# Patient Record
Sex: FEMALE | Race: BLACK OR AFRICAN AMERICAN | Employment: OTHER | URBAN - METROPOLITAN AREA
[De-identification: names, ages, dates, MRNs, and addresses within clinical notes are randomized per-mention and may not be internally consistent; named-entity substitution may affect disease eponyms.]

---

## 2017-01-06 NOTE — TELEPHONE ENCOUNTER
Tiff Sun LPN at 46/76/8952 44:53 AM      Status: Signed : Tiff Sun LPN (Licensed Practical Nurse)       Future Appointments  Date  Time  Provider  Department  Center    11/7/2016  2:40 PM  MD Tamera Wallace   1/1

## 2017-01-09 RX ORDER — POTASSIUM CHLORIDE 10 MEQ/1
TABLET, EXTENDED RELEASE ORAL
Qty: 30 TABLET | Refills: 0 | Status: SHIPPED | OUTPATIENT
Start: 2017-01-09 | End: 2017-01-13

## 2017-01-13 ENCOUNTER — OFFICE VISIT (OUTPATIENT)
Dept: FAMILY MEDICINE CLINIC | Facility: CLINIC | Age: 62
End: 2017-01-13

## 2017-01-13 VITALS
OXYGEN SATURATION: 98 % | HEART RATE: 82 BPM | BODY MASS INDEX: 23 KG/M2 | DIASTOLIC BLOOD PRESSURE: 70 MMHG | SYSTOLIC BLOOD PRESSURE: 102 MMHG | WEIGHT: 136 LBS

## 2017-01-13 DIAGNOSIS — I10 ESSENTIAL HYPERTENSION WITH GOAL BLOOD PRESSURE LESS THAN 140/90: ICD-10-CM

## 2017-01-13 DIAGNOSIS — Z51.81 MEDICATION MONITORING ENCOUNTER: Primary | ICD-10-CM

## 2017-01-13 DIAGNOSIS — I10 ESSENTIAL HYPERTENSION: ICD-10-CM

## 2017-01-13 DIAGNOSIS — M81.6 LOCALIZED OSTEOPOROSIS OF SPINE: ICD-10-CM

## 2017-01-13 DIAGNOSIS — Z23 NEED FOR PROPHYLACTIC VACCINATION AND INOCULATION AGAINST INFLUENZA: ICD-10-CM

## 2017-01-13 DIAGNOSIS — E87.6 HYPOKALEMIA: ICD-10-CM

## 2017-01-13 DIAGNOSIS — K21.9 GASTROESOPHAGEAL REFLUX DISEASE, ESOPHAGITIS PRESENCE NOT SPECIFIED: ICD-10-CM

## 2017-01-13 PROCEDURE — 90686 IIV4 VACC NO PRSV 0.5 ML IM: CPT | Performed by: FAMILY MEDICINE

## 2017-01-13 PROCEDURE — 99214 OFFICE O/P EST MOD 30 MIN: CPT | Performed by: FAMILY MEDICINE

## 2017-01-13 PROCEDURE — 90471 IMMUNIZATION ADMIN: CPT | Performed by: FAMILY MEDICINE

## 2017-01-13 RX ORDER — PREDNISONE 1 MG/1
TABLET ORAL
COMMUNITY
Start: 2017-01-11 | End: 2017-01-13

## 2017-01-13 RX ORDER — AMLODIPINE BESYLATE 2.5 MG/1
TABLET ORAL
Qty: 90 TABLET | Refills: 1 | Status: SHIPPED | OUTPATIENT
Start: 2017-01-13 | End: 2017-07-14

## 2017-01-13 NOTE — PROGRESS NOTES
Sandra Rico IS A 64year old female [address] [place] Patient presents with:  Medication Follow-Up  Imm/Inj: Flu shot       History of present illness:     Doing well, working part time, home visits.      Not on diuretic, but K runs low normal and she gets cra sites    • Insomnia, unspecified    • Encounter for therapeutic drug monitoring    • Anxiety state, unspecified    • Enthesopathy of wrist and carpus    • High blood pressure    • Palpitations    • Glucose intolerance (pre-diabetes)    • Visual impairment MEQ TOTAL) BY MOUTH ONCE DAILY. Disp: 30 tablet Rfl: 0   Methotrexate Sodium, PF, 25 MG/ML Injection Solution 25mg SC once a week. OK to give ones with preservatives.  Disp: 12 mL Rfl: 1   [DISCONTINUED] AmLODIPine Besylate 2.5 MG Oral Tab TAKE 1 TABLET BY Assessment & Plan:   Hakeem Ellison was seen today for medication follow-up and imm/inj. Diagnoses and all orders for this visit:    Medication monitoring encounter  -     ELECTROLYTE PANEL [03813]; Future  -     Magnesium [E];  Future    Essential hype

## 2017-01-13 NOTE — PATIENT INSTRUCTIONS
Please get electrolytes & magnesium done next time you get labs done with Dr. Chirag Latham. We don't have any potassium levels in the last year. Flu shot today. Consider Reclast or Boniva IV for osteoporosis, the drop in spine bone density is concerning.  Dis

## 2017-01-20 RX ORDER — AMLODIPINE BESYLATE 2.5 MG/1
TABLET ORAL
Qty: 90 TABLET | Refills: 1 | OUTPATIENT
Start: 2017-01-20

## 2017-01-20 NOTE — TELEPHONE ENCOUNTER
Future Appointments  Date Time Provider Benjamin Wilkerson   7/14/2017 11:00 AM Aliya Shaw MD [facility] 21 EM[facility] [address]     AmLODIPine Besylate 2.5 MG Oral Tab 90 tablet 1 1/13/2017     Denied already done.

## 2017-02-04 ENCOUNTER — LAB ENCOUNTER (OUTPATIENT)
Dept: LAB | Age: 62
End: 2017-02-04
Attending: FAMILY MEDICINE
Payer: COMMERCIAL

## 2017-02-04 DIAGNOSIS — M06.9 RHEUMATOID ARTHRITIS, INVOLVING UNSPECIFIED SITE, UNSPECIFIED RHEUMATOID FACTOR PRESENCE: ICD-10-CM

## 2017-02-04 DIAGNOSIS — Z51.81 MEDICATION MONITORING ENCOUNTER: ICD-10-CM

## 2017-02-04 DIAGNOSIS — E87.6 HYPOKALEMIA: ICD-10-CM

## 2017-02-04 DIAGNOSIS — I10 ESSENTIAL HYPERTENSION: ICD-10-CM

## 2017-02-04 DIAGNOSIS — M81.0 SENILE OSTEOPOROSIS: ICD-10-CM

## 2017-02-04 DIAGNOSIS — Z79.899 ENCOUNTER FOR LONG-TERM (CURRENT) USE OF HIGH-RISK MEDICATION: ICD-10-CM

## 2017-02-04 DIAGNOSIS — M05.79 RHEUMATOID ARTHRITIS INVOLVING MULTIPLE SITES WITH POSITIVE RHEUMATOID FACTOR (HCC): ICD-10-CM

## 2017-02-04 LAB
ALBUMIN SERPL-MCNC: 3.6 G/DL (ref 3.5–4.8)
ALP LIVER SERPL-CCNC: 62 U/L (ref 50–130)
ALT SERPL-CCNC: 25 U/L (ref 14–54)
AST SERPL-CCNC: 17 U/L (ref 15–41)
BASOPHILS # BLD AUTO: 0.06 X10(3) UL (ref 0–0.1)
BASOPHILS NFR BLD AUTO: 1.1 %
BILIRUB DIRECT SERPL-MCNC: 0.1 MG/DL (ref 0.1–0.5)
BILIRUB SERPL-MCNC: 0.5 MG/DL (ref 0.1–2)
BUN BLD-MCNC: 13 MG/DL (ref 8–20)
C-REACTIVE PROTEIN: <0.29 MG/DL (ref ?–1)
CHLORIDE: 107 MMOL/L (ref 101–111)
CO2: 30 MMOL/L (ref 22–32)
CREAT BLD-MCNC: 0.86 MG/DL (ref 0.55–1.02)
EOSINOPHIL # BLD AUTO: 0.04 X10(3) UL (ref 0–0.3)
EOSINOPHIL NFR BLD AUTO: 0.7 %
ERYTHROCYTE [DISTWIDTH] IN BLOOD BY AUTOMATED COUNT: 14.9 % (ref 11.5–16)
HAV IGM SER QL: 2.1 MG/DL (ref 1.7–3)
HCT VFR BLD AUTO: 36.9 % (ref 34–50)
HGB BLD-MCNC: 11.1 G/DL (ref 12–16)
IMMATURE GRANULOCYTE COUNT: 0.01 X10(3) UL (ref 0–1)
IMMATURE GRANULOCYTE RATIO %: 0.2 %
LYMPHOCYTES # BLD AUTO: 2.73 X10(3) UL (ref 0.9–4)
LYMPHOCYTES NFR BLD AUTO: 49.9 %
M PROTEIN MFR SERPL ELPH: 6.9 G/DL (ref 6.1–8.3)
MCH RBC QN AUTO: 22.9 PG (ref 27–33.2)
MCHC RBC AUTO-ENTMCNC: 30.1 G/DL (ref 31–37)
MCV RBC AUTO: 76.2 FL (ref 81–100)
MONOCYTES # BLD AUTO: 0.31 X10(3) UL (ref 0.1–0.6)
MONOCYTES NFR BLD AUTO: 5.7 %
NEUTROPHIL ABS PRELIM: 2.32 X10 (3) UL (ref 1.3–6.7)
NEUTROPHILS # BLD AUTO: 2.32 X10(3) UL (ref 1.3–6.7)
NEUTROPHILS NFR BLD AUTO: 42.4 %
PLATELET # BLD AUTO: 283 10(3)UL (ref 150–450)
POTASSIUM SERPL-SCNC: 4.1 MMOL/L (ref 3.6–5.1)
RBC # BLD AUTO: 4.84 X10(6)UL (ref 3.8–5.1)
RED CELL DISTRIBUTION WIDTH-SD: 40 FL (ref 35.1–46.3)
SED RATE-ML: 12 MM/HR (ref 0–25)
SODIUM SERPL-SCNC: 142 MMOL/L (ref 136–144)
WBC # BLD AUTO: 5.5 X10(3) UL (ref 4–13)

## 2017-02-04 PROCEDURE — 83735 ASSAY OF MAGNESIUM: CPT

## 2017-02-04 PROCEDURE — 80076 HEPATIC FUNCTION PANEL: CPT

## 2017-02-04 PROCEDURE — 86140 C-REACTIVE PROTEIN: CPT

## 2017-02-04 PROCEDURE — 85652 RBC SED RATE AUTOMATED: CPT

## 2017-02-04 PROCEDURE — 82565 ASSAY OF CREATININE: CPT

## 2017-02-04 PROCEDURE — 84520 ASSAY OF UREA NITROGEN: CPT

## 2017-02-04 PROCEDURE — 80051 ELECTROLYTE PANEL: CPT

## 2017-02-04 PROCEDURE — 36415 COLL VENOUS BLD VENIPUNCTURE: CPT

## 2017-02-04 PROCEDURE — 85025 COMPLETE CBC W/AUTO DIFF WBC: CPT

## 2017-02-06 NOTE — PROGRESS NOTES
Quick Note:    Will discuss results with pt at follow up visit.   2/6/2017 11:30 AM [facility]   7/14/2017 11:00 AM Omer Grubbs MD EMG 21 EMG [address]      ______

## 2017-02-10 RX ORDER — LANSOPRAZOLE 30 MG/1
CAPSULE, DELAYED RELEASE ORAL
Qty: 45 CAPSULE | Refills: 0 | Status: SHIPPED | OUTPATIENT
Start: 2017-02-10 | End: 2017-05-08

## 2017-02-10 NOTE — TELEPHONE ENCOUNTER
Future Appointments  Date Time Provider Benjamin Wilkerson   5/15/2017 2:20 PM MD Husam Boo   7/14/2017 11:00 AM Omer Grubbs MD EMG 21 EMG [address]     LOV 1/17    LAST LAB    LAST RX  LANSOPRAZOLE 30 MG Oral Capsule Delayed Releas

## 2017-03-14 ENCOUNTER — APPOINTMENT (OUTPATIENT)
Dept: CV DIAGNOSTICS | Facility: HOSPITAL | Age: 62
End: 2017-03-14
Attending: EMERGENCY MEDICINE
Payer: COMMERCIAL

## 2017-03-14 ENCOUNTER — APPOINTMENT (OUTPATIENT)
Dept: GENERAL RADIOLOGY | Facility: HOSPITAL | Age: 62
End: 2017-03-14
Attending: EMERGENCY MEDICINE
Payer: COMMERCIAL

## 2017-03-14 PROBLEM — R73.9 HYPERGLYCEMIA: Status: ACTIVE | Noted: 2017-03-14

## 2017-03-14 PROBLEM — E87.6 HYPOKALEMIA: Status: ACTIVE | Noted: 2017-03-14

## 2017-03-14 PROBLEM — D64.9 ANEMIA: Status: ACTIVE | Noted: 2017-03-14

## 2017-03-14 PROCEDURE — 93017 CV STRESS TEST TRACING ONLY: CPT

## 2017-03-14 PROCEDURE — 71010 XR CHEST AP PORTABLE  (CPT=71010): CPT

## 2017-03-14 PROCEDURE — 93018 CV STRESS TEST I&R ONLY: CPT | Performed by: INTERNAL MEDICINE

## 2017-03-14 PROCEDURE — 93350 STRESS TTE ONLY: CPT | Performed by: INTERNAL MEDICINE

## 2017-03-14 PROCEDURE — 93350 STRESS TTE ONLY: CPT

## 2017-03-14 NOTE — ED PROVIDER NOTES
Patient Seen in: [facility] Emergency Department    History   Patient presents with:  Chest Pain Angina (cardiovascular)    Stated Complaint: chest pain    HPI    Patient is a 59-year-old female comes in emergency room for evaluation of chest pain. Alpha thalassemia trait    • Vitamin D deficiency    • Breast calcifications      Inferomedial L breast   • GERD (gastroesophageal reflux disease)    • Esophagitis, reflux    • Diarrhea      w/ Protonix   • Diverticulosis of colon (without mention of hemor ONE CAPSULE BY MOUTH 3 TIMES A WEEK   BD INSULIN SYR ULTRAFINE II 31G X 5/16\" 1 ML Does not apply Misc,     AmLODIPine Besylate 2.5 MG Oral Tab,  TAKE 1 TABLET BY MOUTH EVERY EVENING.    Insulin Syringe-Needle U-100 31G X 3/8\" 1 ML Does not apply Misc,  T 100%        Physical Exam    GENERAL: No acute distress, well appearing and non-toxic, Alert and oriented X 3   HEENT: Normocephalic, atraumatic. Moist mucous membranes.   Pupils equal round reactive to light accommodation, extraocular motion is intact, sc DRAW LIGHT GREEN      EKG    Rate, intervals and axes as noted on EKG Report. Rate: 76  Rhythm: Sinus Rhythm  Reading: No acute change          Chest x-ray negative    ER course: Patient placed in observation status.   She was given GI cocktail with improv

## 2017-03-14 NOTE — BH PROGRESS NOTE
Pt reports 1st preference for in-network hospitals is [facility]. Pt notified of need to transfer out. [facility] are on chart and copies were faxed to [facility].

## 2017-03-14 NOTE — ED NOTES
Patient quiet with family at bedside and attentive. Patient still does not elaborate on the \"intrusive thoughts\" that cause her to think about harming others.  Does not say who the \"others\" are and does not identify a recent stressor that has made these

## 2017-03-14 NOTE — ED NOTES
EKG: Dated March 14, 2017, 6:02 AM.    Normal sinus rhythm. Ventricular rate of 74. Minimal voltage criteria for LVH. Rate, axis, intervals are noted. No acute ST elevations or depressions are noted.   Agree with computer interpretation    Patient endor

## 2017-03-14 NOTE — PROGRESS NOTES
CARDIODIAGNOSTIC PRELIMINARY REPORT:    JANELLE protocol completed for 7:31 with no new EKG changes noted; no arrhythmias    Denied cardiac symptoms    Base:  122/84, HR 75;  Peak: 152/86,  (102% APMHR)  Second set of images pending    Pt, returned to

## 2017-03-14 NOTE — ED NOTES
Patient was seen and evaluated by Dr. Dionte Medina. Her follow-up workup was negative for cardiac ischemia. Her stress test was negative.   Patient was medically cleared for evaluation by [facility].    Patient was seen and evaluated by shivani pimentel
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
## 2017-03-14 NOTE — ED NOTES
Repeat troponin is negative. Consultation obtained with [facility] Dr. Muniz Crew.   Patient to undergo a stress echocardiogram.

## 2017-03-14 NOTE — BH PROGRESS NOTE
Patient was accepted to [facility] under Dr. Nanci Rizvi  RN to RN # [phone] with Moi paz from [facility]  spoke with [place] on 3/14/17 @ 6:13 pm  auth # [id number]  Auth  4 inp  days 3/14-3/17  Gave a

## 2017-03-14 NOTE — ED NOTES
Bedside report obtained from [address] Pt calm, awake and alert. Awaiting to go to [facility].

## 2017-03-14 NOTE — ED INITIAL ASSESSMENT (HPI)
Pt arrived via ems, called for chest pain that woke her from sleep. +dizziness at that time. Denies shortness of breath. Asa 324mg and nitro spray x 1 given with some relief.  5/10 pain on arrival.

## 2017-03-15 NOTE — ED NOTES
Accepting Physciain at [address] [place] [address],[place] [id number]. Spoke with Alisson Vazquez given Nurse to Nurse.

## 2017-03-15 NOTE — ED NOTES
OK per ERMD to ambulate patient who would like to \"stretch her legs\"   Ambulates easily, gait steady.

## 2017-03-15 NOTE — ED NOTES
Call to [facility] ambulance for transport ETA 9PM. Asked dispatcher to find another ambulance for transport.

## 2017-04-14 ENCOUNTER — LAB ENCOUNTER (OUTPATIENT)
Dept: LAB | Age: 62
End: 2017-04-14
Attending: FAMILY MEDICINE
Payer: COMMERCIAL

## 2017-04-14 ENCOUNTER — HOSPITAL ENCOUNTER (EMERGENCY)
Facility: HOSPITAL | Age: 62
Discharge: HOME | End: 2017-04-14
Attending: EMERGENCY MEDICINE
Payer: COMMERCIAL

## 2017-04-14 ENCOUNTER — APPOINTMENT (OUTPATIENT)
Dept: MRI IMAGING | Facility: HOSPITAL | Age: 62
End: 2017-04-14
Attending: EMERGENCY MEDICINE
Payer: COMMERCIAL

## 2017-04-14 VITALS
RESPIRATION RATE: 18 BRPM | DIASTOLIC BLOOD PRESSURE: 93 MMHG | OXYGEN SATURATION: 100 % | SYSTOLIC BLOOD PRESSURE: 157 MMHG | HEIGHT: 63 IN | WEIGHT: 115 LBS | HEART RATE: 74 BPM | BODY MASS INDEX: 20.37 KG/M2 | TEMPERATURE: 98 F

## 2017-04-14 DIAGNOSIS — Z79.899 ENCOUNTER FOR LONG-TERM (CURRENT) USE OF MEDICATIONS: ICD-10-CM

## 2017-04-14 DIAGNOSIS — M81.0 OSTEOPOROSIS: ICD-10-CM

## 2017-04-14 DIAGNOSIS — M05.79 RHEUMATOID ARTHRITIS INVOLVING MULTIPLE SITES WITH POSITIVE RHEUMATOID FACTOR (HCC): ICD-10-CM

## 2017-04-14 DIAGNOSIS — R79.89 LOW TSH LEVEL: ICD-10-CM

## 2017-04-14 DIAGNOSIS — R20.2 PARESTHESIAS: Primary | ICD-10-CM

## 2017-04-14 DIAGNOSIS — Z79.899 ENCOUNTER FOR LONG-TERM (CURRENT) USE OF HIGH-RISK MEDICATION: ICD-10-CM

## 2017-04-14 DIAGNOSIS — M62.81 MUSCLE WEAKNESS: ICD-10-CM

## 2017-04-14 PROCEDURE — 80053 COMPREHEN METABOLIC PANEL: CPT

## 2017-04-14 PROCEDURE — 70551 MRI BRAIN STEM W/O DYE: CPT

## 2017-04-14 PROCEDURE — 93005 ELECTROCARDIOGRAM TRACING: CPT

## 2017-04-14 PROCEDURE — 72141 MRI NECK SPINE W/O DYE: CPT

## 2017-04-14 PROCEDURE — 86140 C-REACTIVE PROTEIN: CPT

## 2017-04-14 PROCEDURE — 85025 COMPLETE CBC W/AUTO DIFF WBC: CPT

## 2017-04-14 PROCEDURE — 84443 ASSAY THYROID STIM HORMONE: CPT

## 2017-04-14 PROCEDURE — 99285 EMERGENCY DEPT VISIT HI MDM: CPT

## 2017-04-14 PROCEDURE — 82306 VITAMIN D 25 HYDROXY: CPT

## 2017-04-14 PROCEDURE — 80061 LIPID PANEL: CPT

## 2017-04-14 PROCEDURE — 36415 COLL VENOUS BLD VENIPUNCTURE: CPT

## 2017-04-14 PROCEDURE — 93010 ELECTROCARDIOGRAM REPORT: CPT

## 2017-04-14 PROCEDURE — 85652 RBC SED RATE AUTOMATED: CPT

## 2017-04-14 PROCEDURE — 83036 HEMOGLOBIN GLYCOSYLATED A1C: CPT

## 2017-04-14 PROCEDURE — 83735 ASSAY OF MAGNESIUM: CPT

## 2017-04-14 NOTE — ED NOTES
[facility] notified of pt request for evaluation and this RN attempting to make pt \"fast pass\" for evaluation. [facility] reports pt is out of network, staff will fax over referrals for pt to follow up with on outpatient basis.

## 2017-04-14 NOTE — ED NOTES
Pt given discharge instructions verbally. Pt requesting copy of MRI report. Informed pt that MRI report is included in paper work. Pt attempted to remove dc papers from my hand.   This RN completed discharge instructions prior to handing over pt's discha

## 2017-04-15 NOTE — ED NOTES
Pt and spouse frustrated with wait time for fax. Apologized profusely to pt and spouse regarding delay in discharge.

## 2017-04-15 NOTE — ED NOTES
Spoke with NGOZI. [facility]  \"tied up\" and will fax paper work as soon as possible.

## 2017-04-16 ENCOUNTER — TELEPHONE (OUTPATIENT)
Dept: FAMILY MEDICINE CLINIC | Facility: CLINIC | Age: 62
End: 2017-04-16

## 2017-04-17 ENCOUNTER — TELEPHONE (OUTPATIENT)
Dept: NEUROLOGY | Facility: CLINIC | Age: 62
End: 2017-04-17

## 2017-04-17 NOTE — TELEPHONE ENCOUNTER
Was called by [facility] on 4/14/17 to inform that pt was seen in ED for neurologic sxs, workup without acute findings but will need f/u. Please see ER records for details.

## 2017-04-25 PROCEDURE — 87086 URINE CULTURE/COLONY COUNT: CPT | Performed by: UROLOGY

## 2017-04-25 PROCEDURE — 81001 URINALYSIS AUTO W/SCOPE: CPT | Performed by: UROLOGY

## 2017-05-09 NOTE — ED NOTES
Luca Taylor File at [facility] pt is accepted at [facility]. [phone] nurses station to contact for report.
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
## 2017-05-09 NOTE — ED NOTES
Patients belongings are in two secure bags #[id number] and [id number] located in the b pod md charting area

## 2017-05-09 NOTE — BH LEVEL OF CARE ASSESSMENT
Level of Care Assessment Note  General Questions   Why are you here?: \"I'm here because my  and I got into an argument. \" Pt states argument was about \"what the schedule should be. \" When asked why she came to the ER, pt states \"He made a stateme months later she quit. She had to retire because of her depressed state. Collateral Information Obtained: In person, Collateral   Collateral Information: ER RN and MD: Pt's  states pt has been acting abnormally and fears for her safety.  Pt isn't e hasn't ever spoken like this. Her face didn't look like her. My wife feels over the past year that everything is great. She sees me as a deadbeat if I sit down on the couch after making meals and mowing the lawn.  I spent hours trying to get her to go to th Suicidal Ideation: Yes   Describe Past Suicidal Ideation: Pt reports \"not concrete\" suicidal thoughts a few months ago   Past Suicide Plan: No   Past Suicide Intent: No   Past Suicide Rehearsal: No   Past Suicide Attempt: No   Past Suicide Risk Mitigatin upset\"   Current/Recent Destructive Behavior Toward Property Ideation: No   Current/Recent Destructive Behavior Toward Property Plan: No   Current/Recent Destrucive Behavior Toward Property Intent: No   Current/Recent Destructive Behavior Toward Property helplessness and hopelessness   Anxiety Symptoms: Generalized   Panic Attacks: pt states \"I have some anxiety\"; pt states if she gets panic attacks \"they are usually pretty mild\"   Trauma Reaction: Other (pt denied)   Bipolar Symptoms: Irritability; Dec Seclusion/Restraint: No   Addictions Screen   Do you sometimes drink beer, wine or other alcohol beverages?: No   Tobacco Use: Never smoked/never used tobacco product   Caffeine (include beverages/tablets) Use: No   Used substances (other than as prescribe Contact: Good   Level of Consciousness: Alert   Exhibited Behavior : Guarded; Appropriate to situation   Gait/Movement: Other (Comment) (rajan; pt was laying on hospital bed)   Speech Characteristics: Normal volume;Delayed   Concentration: Unimpaired   Memory physically aggressive again. Pt denies SI/HI. Level of Care Recommendations   Consulted with: Dr. Renard Bhagat recommended inpt admission at this time.    Level of Care Recommendation: Inpatient Acute Care   Unit: [facility]   Inpatient Criteria: Failu

## 2017-05-09 NOTE — PROGRESS NOTES
[facility] call back stating they spoke with their [facility] and are able to accept the pt. Working on obtaining precert from [place].

## 2017-05-09 NOTE — ED NOTES
Assumed care, pt been evaluated by [facility] staff, Leopoldo Michele

## 2017-05-09 NOTE — ED NOTES
Aliya Lozano from [facility] at bedside for eval at this time.

## 2017-05-09 NOTE — TELEPHONE ENCOUNTER
Future Appointments  Date Time Provider Benjamin Rhea   5/15/2017 2:20 PM MD Ronen Tom   5/17/2017 10:15 AM Bess Pimentel MD [facility]   6/21/2017 11:00 AM Antonieta Means, [address] [facility]   7/14/2017 11:00 AM Pe

## 2017-05-15 RX ORDER — LANSOPRAZOLE 30 MG/1
CAPSULE, DELAYED RELEASE ORAL
Qty: 45 CAPSULE | Refills: 0 | Status: SHIPPED | OUTPATIENT
Start: 2017-05-15 | End: 2017-08-28 | Stop reason: ALTCHOICE

## 2017-05-22 ENCOUNTER — MED REC SCAN ONLY (OUTPATIENT)
Dept: FAMILY MEDICINE CLINIC | Facility: CLINIC | Age: 62
End: 2017-05-22

## 2017-05-30 ENCOUNTER — TELEPHONE (OUTPATIENT)
Dept: FAMILY MEDICINE CLINIC | Facility: CLINIC | Age: 62
End: 2017-05-30

## 2017-05-31 NOTE — TELEPHONE ENCOUNTER
[facility]OSP CALLED, PT BEING DISCHARGE  MADE A NEW HOSP F/U APPT.     Future Appointments  Date Time Provider Benjamin Wilkerson   6/8/2017 11:00 AM Lee Salazar MD EMG 21 EMG [address]   6/13/2017 5:30 PM Matt Dumont MD [facility]

## 2017-06-08 ENCOUNTER — OFFICE VISIT (OUTPATIENT)
Dept: FAMILY MEDICINE CLINIC | Facility: CLINIC | Age: 62
End: 2017-06-08

## 2017-06-08 VITALS
SYSTOLIC BLOOD PRESSURE: 126 MMHG | OXYGEN SATURATION: 98 % | HEIGHT: 63.5 IN | WEIGHT: 110.38 LBS | DIASTOLIC BLOOD PRESSURE: 80 MMHG | HEART RATE: 91 BPM | TEMPERATURE: 99 F | RESPIRATION RATE: 16 BRPM | BODY MASS INDEX: 19.32 KG/M2

## 2017-06-08 DIAGNOSIS — N39.0 URINARY TRACT INFECTION WITHOUT HEMATURIA, SITE UNSPECIFIED: Primary | ICD-10-CM

## 2017-06-08 DIAGNOSIS — K59.00 CONSTIPATION, UNSPECIFIED CONSTIPATION TYPE: ICD-10-CM

## 2017-06-08 DIAGNOSIS — M06.00 SERONEGATIVE RHEUMATOID ARTHRITIS (HCC): ICD-10-CM

## 2017-06-08 DIAGNOSIS — R63.4 WEIGHT LOSS: ICD-10-CM

## 2017-06-08 PROCEDURE — 99214 OFFICE O/P EST MOD 30 MIN: CPT | Performed by: FAMILY MEDICINE

## 2017-06-08 PROCEDURE — 81003 URINALYSIS AUTO W/O SCOPE: CPT | Performed by: FAMILY MEDICINE

## 2017-06-08 PROCEDURE — 87086 URINE CULTURE/COLONY COUNT: CPT | Performed by: FAMILY MEDICINE

## 2017-06-11 NOTE — PATIENT INSTRUCTIONS
Patient was requested to have psychiatrist call me after her upcoming visit so we can coordinate and discuss medical aspects of her care especially concerning her weight loss.  She will consider granting us permission to talk about sensitive issues with her

## 2017-06-23 ENCOUNTER — TELEPHONE (OUTPATIENT)
Dept: FAMILY MEDICINE CLINIC | Facility: CLINIC | Age: 62
End: 2017-06-23

## 2017-06-23 NOTE — TELEPHONE ENCOUNTER
Received/reviewed neuro eval, pt appears to be inpatient again with DX MDD with psychotic features. Neurologist Dr Jennifer Lay does not feel pt has cognitive impairment. Noted normal CT last month. Neuropsych testing outpatient could be considered.  Meds i

## 2017-07-14 ENCOUNTER — OFFICE VISIT (OUTPATIENT)
Dept: FAMILY MEDICINE CLINIC | Facility: CLINIC | Age: 62
End: 2017-07-14

## 2017-07-14 VITALS
BODY MASS INDEX: 18.55 KG/M2 | HEIGHT: 63.5 IN | HEART RATE: 82 BPM | DIASTOLIC BLOOD PRESSURE: 86 MMHG | SYSTOLIC BLOOD PRESSURE: 124 MMHG | TEMPERATURE: 98 F | RESPIRATION RATE: 16 BRPM | WEIGHT: 106 LBS

## 2017-07-14 DIAGNOSIS — R63.4 WEIGHT LOSS: Primary | ICD-10-CM

## 2017-07-14 DIAGNOSIS — K59.00 CONSTIPATION, UNSPECIFIED CONSTIPATION TYPE: ICD-10-CM

## 2017-07-14 DIAGNOSIS — F33.3 SEVERE EPISODE OF RECURRENT MAJOR DEPRESSIVE DISORDER, WITH PSYCHOTIC FEATURES (HCC): ICD-10-CM

## 2017-07-14 DIAGNOSIS — I10 ESSENTIAL HYPERTENSION WITH GOAL BLOOD PRESSURE LESS THAN 140/90: ICD-10-CM

## 2017-07-14 PROCEDURE — 99214 OFFICE O/P EST MOD 30 MIN: CPT | Performed by: FAMILY MEDICINE

## 2017-07-14 RX ORDER — AMLODIPINE BESYLATE 2.5 MG/1
TABLET ORAL
Qty: 90 TABLET | Refills: 1 | Status: SHIPPED | OUTPATIENT
Start: 2017-07-14 | End: 2018-01-03

## 2017-07-14 RX ORDER — POTASSIUM CHLORIDE 750 MG/1
TABLET, EXTENDED RELEASE ORAL
Qty: 90 TABLET | Refills: 1 | Status: SHIPPED | OUTPATIENT
Start: 2017-07-14 | End: 2017-10-10

## 2017-07-14 NOTE — PROGRESS NOTES
Tamika Barragan IS A 64year old female [address] [place] Patient presents with:  Medication Follow-Up       History of present illness:     Discharged 2 weeks ago from [facility]. Was in x 11 days. States she is taking quetiapine. Sleep is fair.  [facility] therapeutic drug monitoring    • Enthesopathy of hip region    • Enthesopathy of wrist and carpus    • Esophageal reflux    • Esophagitis, reflux    • Essential hypertension, benign    • GERD (gastroesophageal reflux disease)    • Glucose intolerance (pre- Delayed Release TAKE ONE CAPSULE BY MOUTH 3 TIMES A WEEK Disp: 45 capsule Rfl: 0   FOLIC ACID 1 MG Oral Tab TAKE 1 TABLET (1 MG TOTAL) BY MOUTH DAILY.  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 Stress with elder parents, daughter, 's health.        Review of systems:      Occasional chest Oral Tab CR; TAKE 1 TABLET (10 MEQ TOTAL) BY MOUTH ONCE DAILY. -     MAGNESIUM; Future  -     COMP METABOLIC PANEL (14);  Future    Constipation, unspecified constipation type    Severe episode of recurrent major depressive disorder, with psychotic feature

## 2017-07-14 NOTE — PATIENT INSTRUCTIONS
For constipation: Miralax 1 capful daily with fluid. Senokot 1 tablet every 2-3 days but not more often. Try to get at least 6 8-ounce glasses of fluid (water is best) daily.    Try more cooked vegetables, continue fruit, adequate protein and increase fib

## 2017-07-24 NOTE — BH PROGRESS NOTE
Pt will need inpt psych treatment. Pt will need to be tx out. Petition on chart. Pt and family updated.

## 2017-07-24 NOTE — ED NOTES
Spoke with Yohannes from [facility]. She will call for update from [facility].
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
## 2017-07-24 NOTE — BH PROGRESS NOTE
Called [facility] re: transfer request. Spoke with Hamlet Santos who reports  will call back. Awaiting call back.

## 2017-07-24 NOTE — BH PROGRESS NOTE
Spoke with Pasquale Pace at [facility] (alternate # [phone]) re: transfer request. Faxed pt clinical to [phone]. Awaiting call back.

## 2017-07-24 NOTE — ED PROVIDER NOTES
Nursing staff advised me at 6 PM that the patient was complaining of abdominal pain which she stated began after she arrived in the emergency department. She had just completed eating a full dinner. She denies any nausea.   She states that the pain is caio

## 2017-07-24 NOTE — ED NOTES
Pt wants to leave. Pt denies suicidal ideation at this time. Pt  in waiting room. Pt is calm and cooperative at this time.

## 2017-07-24 NOTE — ED NOTES
Peggy Berry of [facility] at [facility] ([phone]) made aware of patient's preference to go to as close a facility as possible for transfer.
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
## 2017-07-24 NOTE — ED NOTES
Pt belongings placed in smartsafe bag # [id number] in C0 locker.  Belongings include clothes, shoes and one earring

## 2017-07-25 NOTE — BH PROGRESS NOTE
Called back Aundrea Merlos with [place]. She directed writer to call the ER crisis worker, Farnaz Pack, ([phone]), at [place] to obtain NPI and additional information required to complete precert.   Spoke to Farnaz Pack and was provided with Dr Tamela Capps as the accepting MD a

## 2017-07-25 NOTE — BH PROGRESS NOTE
Called [facility] Intake to request an update. Spoke to Davey bejarano who stated she would call me back shortly.

## 2017-07-25 NOTE — BH PROGRESS NOTE
Called back [facility] as directed by Cathy Rose crisis worker with [address] requested a call back because she is driving.

## 2017-07-25 NOTE — ED NOTES
All of locked patient belongings returned to patient at this time. Valuables to go home with her  at this time.

## 2017-07-25 NOTE — BH PROGRESS NOTE
Spoke to Sonya Ferrera, [facility] crisis worker. Updated her on conversation with Henri Reese at phone number to [facility] that Sonya Frerera provided. Yareli Arias is cleared for transfer to [facility].

## 2017-07-25 NOTE — ED NOTES
Report given at this time to [address] [place] [address] at [place] [phone], admitted to bed 3203-b under Dr Javad Shukla for severe episode of recurrent major depressive disorder, with psychotic features, suicidal and homicidal ideation.

## 2017-07-25 NOTE — BH PROGRESS NOTE
Bal Brown RN in [facility]. She informed writer that Fadi Parker has been accepted for admission to [facility]. Called back Mallorie Machado with [facility] Intake to obtain additional information to complete pre-cert.   Mallorie Machado informed writer that she had left but will look to lo

## 2017-07-25 NOTE — ED NOTES
Maria Isabel Mesa of [facility] ([phone]) calls at this time to clarify, whether patient has a history of dementia or not. Pt and  state that she does not have a formal dementia diagnosis, but does have \"memory issues. \" She is currently awake and alert X4 at [facility]

## 2017-07-25 NOTE — BH PROGRESS NOTE
Errol Davis, Crisis worker from [facility] called back writer. She provided another [facility]  phone number to try: [phone]. Called and spoke to New benjy with [employer].   New benjy advised writer that precert can not be completed after hours and must be done M-F between th

## 2017-08-28 ENCOUNTER — OFFICE VISIT (OUTPATIENT)
Dept: FAMILY MEDICINE CLINIC | Facility: CLINIC | Age: 62
End: 2017-08-28

## 2017-08-28 VITALS
WEIGHT: 117.63 LBS | HEART RATE: 75 BPM | DIASTOLIC BLOOD PRESSURE: 74 MMHG | RESPIRATION RATE: 16 BRPM | TEMPERATURE: 99 F | HEIGHT: 63.5 IN | SYSTOLIC BLOOD PRESSURE: 110 MMHG | BODY MASS INDEX: 20.59 KG/M2 | OXYGEN SATURATION: 99 %

## 2017-08-28 DIAGNOSIS — I10 ESSENTIAL HYPERTENSION: ICD-10-CM

## 2017-08-28 DIAGNOSIS — R63.4 WEIGHT LOSS: ICD-10-CM

## 2017-08-28 DIAGNOSIS — Z12.31 ENCOUNTER FOR SCREENING MAMMOGRAM FOR BREAST CANCER: Primary | ICD-10-CM

## 2017-08-28 DIAGNOSIS — F39 MOOD DISORDER (HCC): ICD-10-CM

## 2017-08-28 PROCEDURE — 99214 OFFICE O/P EST MOD 30 MIN: CPT | Performed by: FAMILY MEDICINE

## 2017-08-28 RX ORDER — ARIPIPRAZOLE 10 MG/1
5 TABLET ORAL DAILY
COMMUNITY
Start: 2017-08-22 | End: 2017-10-09 | Stop reason: DRUGHIGH

## 2017-08-28 RX ORDER — TAMSULOSIN HYDROCHLORIDE 0.4 MG/1
0.4 CAPSULE ORAL DAILY
COMMUNITY
End: 2017-10-09

## 2017-08-28 NOTE — PATIENT INSTRUCTIONS
Stay off methotrexate & folic acid, until you are able to consult with  [facility]. Stay off tamsulosin for now. If you have problems emptying your bladder, you can consult Dr. Avila Kerns again to see if there is a need to restart it. Continue amlodipine.

## 2017-08-28 NOTE — PROGRESS NOTES
Spencer Silverio IS A 58year old female [address] [place] Patient presents with: Follow - Up  Lab Results       History of present illness:     Was admitted [facility] psych unit 7/24, discharged 8/22/17.  here with patient.      Treated for mood disorder and dep hip region    • Enthesopathy of wrist and carpus    • Esophageal reflux    • Esophagitis, reflux    • Essential hypertension, benign    • GERD (gastroesophageal reflux disease)    • Glucose intolerance (pre-diabetes)    • High blood pressure    • Impaired 3350 Oral Powd Pack Take 17 g by mouth as needed. Disp:  Rfl:    Insulin Syringe-Needle U-100 31G X 3/8\" 1 ML Does not apply Misc To be used for methotrexate injections Disp: 100 each Rfl: 0   Magnesium 400 MG Oral Cap Take 400 mg by mouth daily.  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 Stress with elder parents, daughter, 's health. Review of systems:     Appetite good. No current urinary symptoms. GI symptoms have improved, no constipation or diarrhea, no GERD.      Exam:     BP murmur    Test results: none recent    Requests mammogram order    Discussed with patient and  the need to not make any changes in medication without first consulting with her psychiatrist. Importance of family involvement in therapy, and involving

## 2017-10-09 ENCOUNTER — OFFICE VISIT (OUTPATIENT)
Dept: FAMILY MEDICINE CLINIC | Facility: CLINIC | Age: 62
End: 2017-10-09

## 2017-10-09 VITALS
SYSTOLIC BLOOD PRESSURE: 114 MMHG | BODY MASS INDEX: 22 KG/M2 | TEMPERATURE: 98 F | WEIGHT: 124.19 LBS | HEART RATE: 78 BPM | DIASTOLIC BLOOD PRESSURE: 70 MMHG | HEIGHT: 63 IN | OXYGEN SATURATION: 98 % | RESPIRATION RATE: 16 BRPM

## 2017-10-09 DIAGNOSIS — M06.9 RHEUMATOID ARTHRITIS, INVOLVING UNSPECIFIED SITE, UNSPECIFIED RHEUMATOID FACTOR PRESENCE: ICD-10-CM

## 2017-10-09 DIAGNOSIS — E87.6 HYPOKALEMIA: ICD-10-CM

## 2017-10-09 DIAGNOSIS — E04.9 GOITER: ICD-10-CM

## 2017-10-09 DIAGNOSIS — I10 ESSENTIAL HYPERTENSION: ICD-10-CM

## 2017-10-09 DIAGNOSIS — R63.4 WEIGHT LOSS, UNINTENTIONAL: Primary | ICD-10-CM

## 2017-10-09 DIAGNOSIS — F39 MOOD DISORDER (HCC): ICD-10-CM

## 2017-10-09 DIAGNOSIS — K21.00 GASTROESOPHAGEAL REFLUX DISEASE WITH ESOPHAGITIS: ICD-10-CM

## 2017-10-09 DIAGNOSIS — Z23 NEED FOR VACCINATION: ICD-10-CM

## 2017-10-09 PROCEDURE — 90471 IMMUNIZATION ADMIN: CPT | Performed by: FAMILY MEDICINE

## 2017-10-09 PROCEDURE — 90686 IIV4 VACC NO PRSV 0.5 ML IM: CPT | Performed by: FAMILY MEDICINE

## 2017-10-09 PROCEDURE — 99214 OFFICE O/P EST MOD 30 MIN: CPT | Performed by: FAMILY MEDICINE

## 2017-10-09 RX ORDER — ARIPIPRAZOLE 5 MG/1
5 TABLET ORAL
COMMUNITY
Start: 2017-09-18 | End: 2017-10-18

## 2017-10-09 NOTE — PATIENT INSTRUCTIONS
For second opinion on rheumatoid arthritis if you decide to pursue, suggest Dr. Anatoly Bell or [facility]. Encourage reclast within a few months. Continue with your regular psychiatry visits.   If urinary symptoms become more of an issue you can see

## 2017-10-09 NOTE — PROGRESS NOTES
Keo Lee IS A 58year old female [address] [place] Patient presents with: Follow - Up:    6 week f/u   and flu shot given       History of present illness:     Just finished outpt mental health program at [facility]. Was full time. Benefitted from structure.  Tap PONV (postoperative nausea and vomiting)    • Problems with swallowing    • Rheumatoid arthritis (HCC)    • Schatzki's ring    • Seborrheic dermatitis, unspecified    • Tachycardia, unspecified    • Thyrotoxicosis without mention of goiter or other cause, Father    • Heart Attack Father    • Depression Mother    • Hypertension Mother    • Arthritis Mother    • Psychiatric Mother    • Heart Attack Mother        Social history:         Social History  Social History   Marital status:   Spouse name: N/A vaccination  -     FLULAVAL INFLUENZA VACCINE QUAD PRESERVATIVE FREE 0.5 ML    Essential hypertension--controlled    Hypokalemia--controlled on K    Goiter--not hyper/hypo. Mood disorder (HCC)--per psych. Appears still in remission.     Gastroesophageal

## 2017-10-10 ENCOUNTER — HOSPITAL ENCOUNTER (OUTPATIENT)
Dept: MAMMOGRAPHY | Facility: HOSPITAL | Age: 62
Discharge: HOME | End: 2017-10-10
Attending: FAMILY MEDICINE
Payer: COMMERCIAL

## 2017-10-10 DIAGNOSIS — I10 ESSENTIAL HYPERTENSION WITH GOAL BLOOD PRESSURE LESS THAN 140/90: ICD-10-CM

## 2017-10-10 DIAGNOSIS — Z12.31 ENCOUNTER FOR SCREENING MAMMOGRAM FOR BREAST CANCER: ICD-10-CM

## 2017-10-10 PROCEDURE — 77067 SCR MAMMO BI INCL CAD: CPT | Performed by: FAMILY MEDICINE

## 2017-10-11 RX ORDER — POTASSIUM CHLORIDE 10 MEQ/1
TABLET, EXTENDED RELEASE ORAL
Qty: 90 TABLET | Refills: 0 | Status: SHIPPED | OUTPATIENT
Start: 2017-10-11 | End: 2018-04-20

## 2017-10-11 NOTE — TELEPHONE ENCOUNTER
LOV 10/17 Due in January. LAST LAB    LAST RX   Potassium Chloride ER (KLOR-CON M10) 10 MEQ Oral Tab CR 90 tablet 1 7/14/2017     Patient just picked up 90 days. Please advise. No protocol. If filled. Please close.    Thank You

## 2017-11-02 RX ORDER — LANSOPRAZOLE 30 MG/1
CAPSULE, DELAYED RELEASE ORAL
Qty: 45 CAPSULE | Refills: 0 | Status: SHIPPED | OUTPATIENT
Start: 2017-11-02 | End: 2018-01-29

## 2017-11-02 NOTE — TELEPHONE ENCOUNTER
Future Appointments  Date Time Provider Benjamin Wilkerson   1/29/2018 11:00 AM Ana Og MD [facility] [address]     LOV    LAST LAB    LAST RX   LANSOPRAZOLE 30 MG Oral Capsule Delayed Release (Discontinued) 45 capsule 0 5/15/2017 8/28/2017   Sig :

## 2017-12-18 ENCOUNTER — TELEPHONE (OUTPATIENT)
Dept: FAMILY MEDICINE CLINIC | Facility: CLINIC | Age: 62
End: 2017-12-18

## 2017-12-18 NOTE — TELEPHONE ENCOUNTER
BP running 120-145/ on Prednisone. Says she took an extra dose of her AmLODIPine Besylate 2.5 MG and it didn't help.      Future Appointments  Date Time Provider Benjamin Wilkerson   12/20/2017 10:30 AM Sheyla Arroyo,  [facility] [address]   1/8/2018

## 2017-12-18 NOTE — TELEPHONE ENCOUNTER
**LEFT MSG ON FRONT OFFICE VM**    Asked to have a nurse call her back. Stated Bridgette Arriaza is having BP issues\". Pls call.

## 2017-12-20 ENCOUNTER — OFFICE VISIT (OUTPATIENT)
Dept: FAMILY MEDICINE CLINIC | Facility: CLINIC | Age: 62
End: 2017-12-20

## 2017-12-20 VITALS
SYSTOLIC BLOOD PRESSURE: 122 MMHG | HEIGHT: 63 IN | DIASTOLIC BLOOD PRESSURE: 80 MMHG | RESPIRATION RATE: 16 BRPM | WEIGHT: 136 LBS | BODY MASS INDEX: 24.1 KG/M2 | HEART RATE: 84 BPM | TEMPERATURE: 99 F

## 2017-12-20 DIAGNOSIS — I10 ESSENTIAL HYPERTENSION WITH GOAL BLOOD PRESSURE LESS THAN 140/90: Primary | ICD-10-CM

## 2017-12-20 PROCEDURE — 99213 OFFICE O/P EST LOW 20 MIN: CPT | Performed by: FAMILY MEDICINE

## 2017-12-20 RX ORDER — BIOTIN 1 MG
1000 TABLET ORAL
COMMUNITY

## 2017-12-20 RX ORDER — CHLORTHALIDONE 25 MG/1
25 TABLET ORAL DAILY
Qty: 30 TABLET | Refills: 0 | Status: SHIPPED | OUTPATIENT
Start: 2017-12-20 | End: 2018-01-03

## 2017-12-20 NOTE — PATIENT INSTRUCTIONS
Start chlorthalidone 25 mg daily  Check BP daily and bring log to next visit  Goal < 140/90  Get blood test one day before appointment  Follow up in 2 weeks         Discharge Instructions for High Blood Pressure (Hypertension)  You have been diagnosed with day.   · Follow the DASH (Dietary Approaches to Stop Hypertension) eating plan. This plan recommends vegetables, fruits, whole gains, and other heart healthy foods. · Begin an exercise program. Ask your doctor how to get started.  The American [employer] Associ

## 2017-12-20 NOTE — PROGRESS NOTES
HPI:   Lee Aguero is a 58year old female that presents for follow-up hypertension she is currently on amlodipine 2.5 mg daily. She has a history of RA currently on sulfasalazine and prednisone.   She has had increased headaches at home and noted he in HPI    PHYSICAL EXAM:   /80   Pulse 84   Temp 98.8 °F (37.1 °C) (Temporal)   Resp 16   Ht 63\"   Wt 136 lb   BMI 24.09 kg/m²  Estimated body mass index is 24.09 kg/m² as calculated from the following:    Height as of this encounter: 63\".     Weigh

## 2018-01-02 ENCOUNTER — TELEPHONE (OUTPATIENT)
Dept: FAMILY MEDICINE CLINIC | Facility: CLINIC | Age: 63
End: 2018-01-02

## 2018-01-02 ENCOUNTER — LAB ENCOUNTER (OUTPATIENT)
Dept: LAB | Age: 63
End: 2018-01-02
Attending: FAMILY MEDICINE
Payer: COMMERCIAL

## 2018-01-02 DIAGNOSIS — Z79.899 ENCOUNTER FOR LONG-TERM (CURRENT) USE OF HIGH-RISK MEDICATION: ICD-10-CM

## 2018-01-02 DIAGNOSIS — I10 ESSENTIAL HYPERTENSION WITH GOAL BLOOD PRESSURE LESS THAN 140/90: ICD-10-CM

## 2018-01-02 DIAGNOSIS — M05.79 RHEUMATOID ARTHRITIS INVOLVING MULTIPLE SITES WITH POSITIVE RHEUMATOID FACTOR (HCC): ICD-10-CM

## 2018-01-02 DIAGNOSIS — R63.4 WEIGHT LOSS: ICD-10-CM

## 2018-01-02 DIAGNOSIS — M81.0 OSTEOPOROSIS: ICD-10-CM

## 2018-01-02 LAB
25-HYDROXYVITAMIN D (TOTAL): 24.7 NG/ML (ref 30–100)
ALBUMIN SERPL-MCNC: 3.5 G/DL (ref 3.5–4.8)
ALP LIVER SERPL-CCNC: 60 U/L (ref 50–130)
ALT SERPL-CCNC: 19 U/L (ref 14–54)
AST SERPL-CCNC: 15 U/L (ref 15–41)
BASOPHILS # BLD AUTO: 0.04 X10(3) UL (ref 0–0.1)
BASOPHILS NFR BLD AUTO: 0.7 %
BILIRUB DIRECT SERPL-MCNC: <0.1 MG/DL (ref 0.1–0.5)
BILIRUB SERPL-MCNC: 0.2 MG/DL (ref 0.1–2)
BUN BLD-MCNC: 13 MG/DL (ref 8–20)
BUN BLD-MCNC: 13 MG/DL (ref 8–20)
C-REACTIVE PROTEIN: <0.29 MG/DL (ref ?–1)
CALCIUM BLD-MCNC: 9.7 MG/DL (ref 8.3–10.3)
CHLORIDE: 103 MMOL/L (ref 101–111)
CO2: 30 MMOL/L (ref 22–32)
CREAT BLD-MCNC: 0.86 MG/DL (ref 0.55–1.02)
CREAT BLD-MCNC: 0.86 MG/DL (ref 0.55–1.02)
EOSINOPHIL # BLD AUTO: 0.08 X10(3) UL (ref 0–0.3)
EOSINOPHIL NFR BLD AUTO: 1.4 %
ERYTHROCYTE [DISTWIDTH] IN BLOOD BY AUTOMATED COUNT: 14.5 % (ref 11.5–16)
GLUCOSE BLD-MCNC: 80 MG/DL (ref 70–99)
HAV IGM SER QL: 2 MG/DL (ref 1.7–3)
HCT VFR BLD AUTO: 40.1 % (ref 34–50)
HGB BLD-MCNC: 12.2 G/DL (ref 12–16)
IMMATURE GRANULOCYTE COUNT: 0.03 X10(3) UL (ref 0–1)
IMMATURE GRANULOCYTE RATIO %: 0.5 %
LYMPHOCYTES # BLD AUTO: 2.31 X10(3) UL (ref 0.9–4)
LYMPHOCYTES NFR BLD AUTO: 40.8 %
M PROTEIN MFR SERPL ELPH: 7.3 G/DL (ref 6.1–8.3)
MCH RBC QN AUTO: 22.3 PG (ref 27–33.2)
MCHC RBC AUTO-ENTMCNC: 30.4 G/DL (ref 31–37)
MCV RBC AUTO: 73.3 FL (ref 81–100)
MONOCYTES # BLD AUTO: 0.61 X10(3) UL (ref 0.1–0.6)
MONOCYTES NFR BLD AUTO: 10.8 %
NEUTROPHIL ABS PRELIM: 2.59 X10 (3) UL (ref 1.3–6.7)
NEUTROPHILS # BLD AUTO: 2.59 X10(3) UL (ref 1.3–6.7)
NEUTROPHILS NFR BLD AUTO: 45.8 %
PLATELET # BLD AUTO: 332 10(3)UL (ref 150–450)
POTASSIUM SERPL-SCNC: 3.5 MMOL/L (ref 3.6–5.1)
RBC # BLD AUTO: 5.47 X10(6)UL (ref 3.8–5.1)
RED CELL DISTRIBUTION WIDTH-SD: 38.1 FL (ref 35.1–46.3)
SED RATE-ML: 13 MM/HR (ref 0–25)
SODIUM SERPL-SCNC: 140 MMOL/L (ref 136–144)
WBC # BLD AUTO: 5.7 X10(3) UL (ref 4–13)

## 2018-01-02 PROCEDURE — 82306 VITAMIN D 25 HYDROXY: CPT

## 2018-01-02 PROCEDURE — 83735 ASSAY OF MAGNESIUM: CPT

## 2018-01-02 PROCEDURE — 82565 ASSAY OF CREATININE: CPT

## 2018-01-02 PROCEDURE — 85025 COMPLETE CBC W/AUTO DIFF WBC: CPT

## 2018-01-02 PROCEDURE — 82248 BILIRUBIN DIRECT: CPT

## 2018-01-02 PROCEDURE — 80053 COMPREHEN METABOLIC PANEL: CPT

## 2018-01-02 PROCEDURE — 84520 ASSAY OF UREA NITROGEN: CPT

## 2018-01-02 PROCEDURE — 36415 COLL VENOUS BLD VENIPUNCTURE: CPT

## 2018-01-02 PROCEDURE — 85652 RBC SED RATE AUTOMATED: CPT

## 2018-01-02 PROCEDURE — 86140 C-REACTIVE PROTEIN: CPT

## 2018-01-02 RX ORDER — AMLODIPINE BESYLATE 2.5 MG/1
TABLET ORAL
Qty: 90 TABLET | Refills: 1 | OUTPATIENT
Start: 2018-01-02

## 2018-01-02 NOTE — TELEPHONE ENCOUNTER
Future Appointments  Date Time Provider Benjamin Wilkerson   1/3/2018 2:00 PM Rusty España DO [facility] [address]   1/8/2018 11:30 AM MD Evita Prajapati Comp   1/29/2018 11:00 AM Domi Rey MD [facility] 21 [facility] [address]     LOV    LAST LAB    LAST RX

## 2018-01-03 ENCOUNTER — OFFICE VISIT (OUTPATIENT)
Dept: FAMILY MEDICINE CLINIC | Facility: CLINIC | Age: 63
End: 2018-01-03

## 2018-01-03 VITALS
HEIGHT: 63 IN | HEART RATE: 77 BPM | DIASTOLIC BLOOD PRESSURE: 70 MMHG | WEIGHT: 137.13 LBS | RESPIRATION RATE: 16 BRPM | SYSTOLIC BLOOD PRESSURE: 118 MMHG | BODY MASS INDEX: 24.3 KG/M2

## 2018-01-03 DIAGNOSIS — I10 ESSENTIAL HYPERTENSION WITH GOAL BLOOD PRESSURE LESS THAN 140/90: ICD-10-CM

## 2018-01-03 DIAGNOSIS — Z23 NEED FOR PNEUMOCOCCAL VACCINE: Primary | ICD-10-CM

## 2018-01-03 PROCEDURE — 90471 IMMUNIZATION ADMIN: CPT | Performed by: FAMILY MEDICINE

## 2018-01-03 PROCEDURE — 90732 PPSV23 VACC 2 YRS+ SUBQ/IM: CPT | Performed by: FAMILY MEDICINE

## 2018-01-03 PROCEDURE — 99213 OFFICE O/P EST LOW 20 MIN: CPT | Performed by: FAMILY MEDICINE

## 2018-01-03 RX ORDER — AMLODIPINE BESYLATE 2.5 MG/1
TABLET ORAL
Qty: 90 TABLET | Refills: 1 | Status: SHIPPED | OUTPATIENT
Start: 2018-01-03 | End: 2018-06-18

## 2018-01-03 RX ORDER — CHLORTHALIDONE 25 MG/1
12.5 TABLET ORAL DAILY
Qty: 45 TABLET | Refills: 0 | Status: SHIPPED | OUTPATIENT
Start: 2018-01-03 | End: 2018-01-24

## 2018-01-03 NOTE — PROGRESS NOTES
Will discuss results with pt at follow up visit.   1/8/2018   11:30 AM   MD Tavo Ryan Comfort  1/29/2018  11:00 AM   Shelly Matos MD      EMG 21         EM[facility] [address]

## 2018-01-03 NOTE — PROGRESS NOTES
HPI:   Griselda Estes is a 58year old female that presents for follow-up hypertension. Currently on amlodipine 2.5 with recent addition of chlorthalidone.   She has been taking half of a 25 mg tablet and BP consistently at goal.  Her pedal edema has re /70   Pulse 77   Resp 16   Ht 63\"   Wt 137 lb 2 oz   BMI 24.29 kg/m²  Estimated body mass index is 24.29 kg/m² as calculated from the following:    Height as of this encounter: 63\". Weight as of this encounter: 137 lb 2 oz.    Vital signs revie

## 2018-01-08 PROBLEM — M79.672 FOOT PAIN, LEFT: Status: ACTIVE | Noted: 2018-01-08

## 2018-01-08 NOTE — TELEPHONE ENCOUNTER
AmLODIPine Besylate 2.5 MG Oral Tab 90 tablet 1 1/3/2018     Sig: TAKE 1 TABLET BY MOUTH EVERY EVENING.     E-Prescribing Status: Receipt confirmed by pharmacy (1/3/2018  2:12 PM CST)    Associated Diagnoses     Essential hypertension with goal blood pres

## 2018-01-08 NOTE — TELEPHONE ENCOUNTER
Pt called. Unsure why Refill Request was denied. Explained it said denied - \" that she had enough until appt\" which was the very next day. Dr. Dax Montes did NOT give pt a Refill on this medication. Needs Refill. Please call pt.

## 2018-01-24 DIAGNOSIS — I10 ESSENTIAL HYPERTENSION WITH GOAL BLOOD PRESSURE LESS THAN 140/90: ICD-10-CM

## 2018-01-24 RX ORDER — CHLORTHALIDONE 25 MG/1
25 TABLET ORAL DAILY
Qty: 30 TABLET | Refills: 0 | Status: SHIPPED | OUTPATIENT
Start: 2018-01-24 | End: 2018-01-29

## 2018-01-24 RX ORDER — CHLORTHALIDONE 25 MG/1
25 TABLET ORAL DAILY
Qty: 30 TABLET | Refills: 0 | Status: SHIPPED | OUTPATIENT
Start: 2018-01-24 | End: 2018-01-24

## 2018-01-24 NOTE — TELEPHONE ENCOUNTER
Future Appointments  Date Time Provider Benjamin Wilkerson   1/29/2018 11:00 AM Frankey Shackleton, MD [facility] [address]   2/8/2018 9:15 AM Heidy Lott MD Ulysses Kawasaki     LOV 1/18 Due in a month for BP check.      LAST LAB 1/18    LAST RX   chlorthalidone

## 2018-01-29 ENCOUNTER — OFFICE VISIT (OUTPATIENT)
Dept: FAMILY MEDICINE CLINIC | Facility: CLINIC | Age: 63
End: 2018-01-29

## 2018-01-29 VITALS
RESPIRATION RATE: 16 BRPM | WEIGHT: 137 LBS | SYSTOLIC BLOOD PRESSURE: 122 MMHG | BODY MASS INDEX: 23.97 KG/M2 | OXYGEN SATURATION: 99 % | HEART RATE: 88 BPM | HEIGHT: 63.25 IN | DIASTOLIC BLOOD PRESSURE: 68 MMHG | TEMPERATURE: 98 F

## 2018-01-29 DIAGNOSIS — E87.6 HYPOKALEMIA: Primary | ICD-10-CM

## 2018-01-29 DIAGNOSIS — I10 ESSENTIAL HYPERTENSION WITH GOAL BLOOD PRESSURE LESS THAN 140/90: ICD-10-CM

## 2018-01-29 PROCEDURE — 99213 OFFICE O/P EST LOW 20 MIN: CPT | Performed by: FAMILY MEDICINE

## 2018-01-29 RX ORDER — LANSOPRAZOLE 30 MG/1
CAPSULE, DELAYED RELEASE ORAL
Qty: 45 CAPSULE | Refills: 0 | Status: SHIPPED | OUTPATIENT
Start: 2018-01-29 | End: 2018-04-05

## 2018-01-29 RX ORDER — CHLORTHALIDONE 25 MG/1
25 TABLET ORAL DAILY
Qty: 45 TABLET | Refills: 1 | Status: SHIPPED | OUTPATIENT
Start: 2018-01-29 | End: 2018-01-29

## 2018-01-29 RX ORDER — POTASSIUM CHLORIDE 750 MG/1
10 TABLET, EXTENDED RELEASE ORAL 2 TIMES DAILY
Qty: 180 TABLET | Refills: 1 | Status: SHIPPED | OUTPATIENT
Start: 2018-01-29 | End: 2018-02-08 | Stop reason: ALTCHOICE

## 2018-01-29 RX ORDER — CHLORTHALIDONE 25 MG/1
25 TABLET ORAL DAILY
Qty: 45 TABLET | Refills: 1 | Status: SHIPPED | OUTPATIENT
Start: 2018-01-29 | End: 2018-01-31

## 2018-01-29 NOTE — TELEPHONE ENCOUNTER
Pt was given rx for 45 tablets, she is taking 1/2 tablet daily so I sent for 90 days rx which is 45 tablets. Please confirm with insurance.

## 2018-01-29 NOTE — PROGRESS NOTES
Emmy Mccracken IS A 58year old female [address] [place] Patient presents with: Follow - Up: Room 4. Med refills. Pt thought it was a physical.        History of present illness:     HTN: Reviewed Dr. Jory Cantu note from last visit.  Started 1/2 tab chlorthalidon Encounter for long-term (current) use of other*  No date: Encounter for therapeutic drug monitoring  No date: Enthesopathy of hip region  No date: Enthesopathy of wrist and carpus  No date: Esophageal reflux  No date: Esophagitis, reflux  No date: [place] Oral Tab TAKE 1 TABLET BY MOUTH EVERY EVENING. Disp: 90 tablet Rfl: 1   Biotin 1000 MCG Oral Tab Take 1,000 mcg by mouth every other day.  Disp:  Rfl:    LANSOPRAZOLE 30 MG Oral Capsule Delayed Release TAKE ONE CAPSULE BY MOUTH 3 TIMES A WEEK Disp: 45 capsu Main Topics   Smoking status: Never Smoker    Smokeless tobacco: Never Used    Alcohol use No    Drug use: No    Sexual activity: Yes     Other Topics Concern    Caffeine Concern Yes    Comment: 1 cup coffee daily and limited on coke     Occupational Expos • [address] 01/02/2018 22.3*   • MCHC 01/02/2018 30.4*   • RDW 01/02/2018 14.5    • RDW-SD 01/02/2018 38.1    • Neutrophil Absolute Prel* 01/02/2018 2.59    • Neutrophil Absolute 01/02/2018 2.59    • Lymphocyte Absolute 01/02/2018 2.31    • Monocyte Absolute 0

## 2018-01-29 NOTE — TELEPHONE ENCOUNTER
K+ was for 6 months at her appointment today. LANSOPRAZOLE 30 MG Oral Capsule Delayed Release 45 capsule 0 11/2/2017     Sig :  TAKE ONE CAPSULE BY MOUTH 3 TIMES A WEEK      Please advise. No protocol. If filled. Please close.    Thank You

## 2018-01-29 NOTE — TELEPHONE ENCOUNTER
Future Appointments  Date Time Provider Benjamin Wilkerson   1/29/2018 11:00 AM Gabriela Dumas MD [facility] [address]   2/8/2018 9:15 AM MD Ishaan Copeland     Hold for appointment.

## 2018-01-29 NOTE — TELEPHONE ENCOUNTER
**PHARMACIST LEFT MSG ON FRONT OFFICE VM**  [facility]    INS requires a 90 day supply of water pill. OK to authorize? Call Pharmacy back to [facility].     Potassium Chloride ER (KLOR-CON M10) 10 MEQ Oral Tab  tablet 1 1/29/2018    Sig :  Take 1 tablet (10 mEq

## 2018-01-29 NOTE — PATIENT INSTRUCTIONS
Increase Klorcon to twice daily. rx sent for 3 months with a refill. Continue chlorthalidone 1/2 tablet daily. Recheck potassium in a month, will review other labs from Dr. Shantelle Justin at a 3 month followup.  Recheck in 3 months for BP & potassium & general hea

## 2018-01-31 ENCOUNTER — TELEPHONE (OUTPATIENT)
Dept: FAMILY MEDICINE CLINIC | Facility: CLINIC | Age: 63
End: 2018-01-31

## 2018-01-31 DIAGNOSIS — I10 ESSENTIAL HYPERTENSION WITH GOAL BLOOD PRESSURE LESS THAN 140/90: ICD-10-CM

## 2018-01-31 RX ORDER — CHLORTHALIDONE 25 MG/1
TABLET ORAL
Qty: 45 TABLET | Refills: 1 | Status: SHIPPED | OUTPATIENT
Start: 2018-01-31 | End: 2018-08-13

## 2018-01-31 NOTE — TELEPHONE ENCOUNTER
Continue chlorthalidone 1/2 tablet daily. chlorthalidone 25 MG Oral Tab 45 tablet 1 1/29/2018 4/29/2018   Sig :  Take 1 tablet (25 mg total) by mouth daily. Route:   Oral       Sent correct dose for 6 months.

## 2018-02-06 ENCOUNTER — LAB ENCOUNTER (OUTPATIENT)
Dept: LAB | Age: 63
End: 2018-02-06
Attending: INTERNAL MEDICINE
Payer: COMMERCIAL

## 2018-02-06 DIAGNOSIS — Z79.899 ENCOUNTER FOR LONG-TERM (CURRENT) USE OF HIGH-RISK MEDICATION: ICD-10-CM

## 2018-02-06 DIAGNOSIS — D72.819 LEUKOPENIA, UNSPECIFIED TYPE: ICD-10-CM

## 2018-02-06 DIAGNOSIS — M79.672 FOOT PAIN, LEFT: ICD-10-CM

## 2018-02-06 DIAGNOSIS — M81.0 SENILE OSTEOPOROSIS: ICD-10-CM

## 2018-02-06 DIAGNOSIS — M05.79 RHEUMATOID ARTHRITIS INVOLVING MULTIPLE SITES WITH POSITIVE RHEUMATOID FACTOR (HCC): ICD-10-CM

## 2018-02-06 LAB
ALBUMIN SERPL-MCNC: 3.7 G/DL (ref 3.5–4.8)
ALP LIVER SERPL-CCNC: 58 U/L (ref 50–130)
ALT SERPL-CCNC: 18 U/L (ref 14–54)
AST SERPL-CCNC: 14 U/L (ref 15–41)
BASOPHILS # BLD AUTO: 0.04 X10(3) UL (ref 0–0.1)
BASOPHILS NFR BLD AUTO: 0.9 %
BILIRUB DIRECT SERPL-MCNC: <0.1 MG/DL (ref 0.1–0.5)
BILIRUB SERPL-MCNC: 0.2 MG/DL (ref 0.1–2)
BUN BLD-MCNC: 14 MG/DL (ref 8–20)
C-REACTIVE PROTEIN: <0.29 MG/DL (ref ?–1)
CREAT BLD-MCNC: 0.88 MG/DL (ref 0.55–1.02)
EOSINOPHIL # BLD AUTO: 0.04 X10(3) UL (ref 0–0.3)
EOSINOPHIL NFR BLD AUTO: 0.9 %
ERYTHROCYTE [DISTWIDTH] IN BLOOD BY AUTOMATED COUNT: 14.3 % (ref 11.5–16)
HCT VFR BLD AUTO: 40.5 % (ref 34–50)
HGB BLD-MCNC: 12.4 G/DL (ref 12–16)
IMMATURE GRANULOCYTE COUNT: 0.01 X10(3) UL (ref 0–1)
IMMATURE GRANULOCYTE RATIO %: 0.2 %
LYMPHOCYTES # BLD AUTO: 2.48 X10(3) UL (ref 0.9–4)
LYMPHOCYTES NFR BLD AUTO: 54.9 %
M PROTEIN MFR SERPL ELPH: 7.4 G/DL (ref 6.1–8.3)
MCH RBC QN AUTO: 22.8 PG (ref 27–33.2)
MCHC RBC AUTO-ENTMCNC: 30.6 G/DL (ref 31–37)
MCV RBC AUTO: 74.3 FL (ref 81–100)
MONOCYTES # BLD AUTO: 0.57 X10(3) UL (ref 0.1–0.6)
MONOCYTES NFR BLD AUTO: 12.6 %
NEUTROPHIL ABS PRELIM: 1.38 X10 (3) UL (ref 1.3–6.7)
NEUTROPHILS # BLD AUTO: 1.38 X10(3) UL (ref 1.3–6.7)
NEUTROPHILS NFR BLD AUTO: 30.5 %
PLATELET # BLD AUTO: 250 10(3)UL (ref 150–450)
RBC # BLD AUTO: 5.45 X10(6)UL (ref 3.8–5.1)
RED CELL DISTRIBUTION WIDTH-SD: 37.4 FL (ref 35.1–46.3)
SED RATE-ML: 13 MM/HR (ref 0–25)
WBC # BLD AUTO: 4.5 X10(3) UL (ref 4–13)

## 2018-02-06 PROCEDURE — 86140 C-REACTIVE PROTEIN: CPT

## 2018-02-06 PROCEDURE — 80076 HEPATIC FUNCTION PANEL: CPT

## 2018-02-06 PROCEDURE — 85652 RBC SED RATE AUTOMATED: CPT

## 2018-02-06 PROCEDURE — 82565 ASSAY OF CREATININE: CPT

## 2018-02-06 PROCEDURE — 84520 ASSAY OF UREA NITROGEN: CPT

## 2018-02-06 PROCEDURE — 85025 COMPLETE CBC W/AUTO DIFF WBC: CPT

## 2018-02-06 PROCEDURE — 36415 COLL VENOUS BLD VENIPUNCTURE: CPT

## 2018-02-07 NOTE — PROGRESS NOTES
Will discuss results with pt at follow up visit.   2/8/2018   9:15 AM    MD Amira Garsia  2/26/2018  3:00 PM    Lee Salazar MD      EMG 21         [facility] [address]  4/30/2018  11:00 AM   Lee Salazar MD      EMG 21

## 2018-02-20 DIAGNOSIS — I10 ESSENTIAL HYPERTENSION WITH GOAL BLOOD PRESSURE LESS THAN 140/90: ICD-10-CM

## 2018-02-21 RX ORDER — CHLORTHALIDONE 25 MG/1
TABLET ORAL
Qty: 45 TABLET | Refills: 1 | OUTPATIENT
Start: 2018-02-21

## 2018-02-21 NOTE — TELEPHONE ENCOUNTER
Future Appointments  Date Time Provider Benjamin Wilkerson   2/26/2018 3:00 PM Victor Manuel Guzman MD [facility] [address]   4/5/2018 9:30 AM Jordon Erwin MD Merlin Record   4/30/2018 11:00 AM Victor Manuel Guzman MD [facility] 21 [facility] [address]     LAST RX   chlorthalidone

## 2018-03-15 RX ORDER — LANSOPRAZOLE 30 MG/1
CAPSULE, DELAYED RELEASE ORAL
Qty: 45 CAPSULE | Refills: 0 | Status: SHIPPED | OUTPATIENT
Start: 2018-03-15 | End: 2018-10-22

## 2018-03-15 NOTE — TELEPHONE ENCOUNTER
No Protocol    Last OV 1/29/18 # 45    Future Appointments  Date Time Provider Benjamin Wilkerson   4/5/2018 9:30 AM MD Dima Carpio   4/30/2018 11:00 AM Ana Og MD [facility] [address]       Please advise refill

## 2018-04-12 ENCOUNTER — APPOINTMENT (OUTPATIENT)
Dept: LAB | Age: 63
End: 2018-04-12
Attending: FAMILY MEDICINE
Payer: COMMERCIAL

## 2018-04-12 DIAGNOSIS — E87.6 HYPOKALEMIA: ICD-10-CM

## 2018-04-12 DIAGNOSIS — I10 ESSENTIAL HYPERTENSION WITH GOAL BLOOD PRESSURE LESS THAN 140/90: ICD-10-CM

## 2018-04-12 PROCEDURE — 84132 ASSAY OF SERUM POTASSIUM: CPT

## 2018-04-12 PROCEDURE — 36415 COLL VENOUS BLD VENIPUNCTURE: CPT

## 2018-04-19 ENCOUNTER — PATIENT MESSAGE (OUTPATIENT)
Dept: FAMILY MEDICINE CLINIC | Facility: CLINIC | Age: 63
End: 2018-04-19

## 2018-04-19 DIAGNOSIS — I10 ESSENTIAL HYPERTENSION WITH GOAL BLOOD PRESSURE LESS THAN 140/90: ICD-10-CM

## 2018-04-20 RX ORDER — POTASSIUM CHLORIDE 750 MG/1
10 TABLET, EXTENDED RELEASE ORAL 2 TIMES DAILY
Qty: 180 TABLET | Refills: 1 | Status: SHIPPED | OUTPATIENT
Start: 2018-04-20 | End: 2018-12-16

## 2018-04-20 NOTE — TELEPHONE ENCOUNTER
From: Sandra Rico  To: Jerome Pardo MD  Sent: 4/19/2018 2:28 PM CDT  Subject: Test Results Question    Hello: My potassium level is now in normal range. I have been taking 10 mEq twice daily. Should I continue this dose?

## 2018-05-24 ENCOUNTER — OFFICE VISIT (OUTPATIENT)
Dept: FAMILY MEDICINE CLINIC | Facility: CLINIC | Age: 63
End: 2018-05-24

## 2018-05-24 VITALS
RESPIRATION RATE: 15 BRPM | SYSTOLIC BLOOD PRESSURE: 110 MMHG | DIASTOLIC BLOOD PRESSURE: 68 MMHG | BODY MASS INDEX: 24.48 KG/M2 | WEIGHT: 138.19 LBS | HEIGHT: 63 IN | HEART RATE: 89 BPM | OXYGEN SATURATION: 99 % | TEMPERATURE: 98 F

## 2018-05-24 DIAGNOSIS — I10 ESSENTIAL HYPERTENSION: ICD-10-CM

## 2018-05-24 DIAGNOSIS — M81.0 AGE-RELATED OSTEOPOROSIS WITHOUT CURRENT PATHOLOGICAL FRACTURE: ICD-10-CM

## 2018-05-24 DIAGNOSIS — Z00.00 WELL ADULT EXAM: Primary | ICD-10-CM

## 2018-05-24 DIAGNOSIS — Z12.31 ENCOUNTER FOR SCREENING MAMMOGRAM FOR BREAST CANCER: ICD-10-CM

## 2018-05-24 PROBLEM — R41.9 COGNITIVE COMPLAINTS WITH NORMAL NEUROPSYCHOLOGICAL EXAM: Status: ACTIVE | Noted: 2017-08-04

## 2018-05-24 PROBLEM — R33.9 RETENTION OF URINE: Status: ACTIVE | Noted: 2017-03-28

## 2018-05-24 PROBLEM — F41.1 GAD (GENERALIZED ANXIETY DISORDER): Status: ACTIVE | Noted: 2017-06-18

## 2018-05-24 PROBLEM — F31.9 BIPOLAR DISORDER WITH DEPRESSION (HCC): Status: ACTIVE | Noted: 2017-07-21

## 2018-05-24 PROBLEM — F33.2 MAJOR DEPRESSIVE DISORDER, RECURRENT EPISODE, SEVERE (HCC): Status: ACTIVE | Noted: 2017-07-14

## 2018-05-24 PROCEDURE — 99396 PREV VISIT EST AGE 40-64: CPT | Performed by: FAMILY MEDICINE

## 2018-05-24 PROCEDURE — 99213 OFFICE O/P EST LOW 20 MIN: CPT | Performed by: FAMILY MEDICINE

## 2018-05-24 RX ORDER — FOLIC ACID 1 MG/1
TABLET ORAL
Refills: 3 | COMMUNITY
Start: 2018-04-16 | End: 2018-07-05

## 2018-05-24 NOTE — PATIENT INSTRUCTIONS
Amlodipine 5 mg at bedtime (two of your 2.5 mg tablets) for 2-3 weeks, let us know how BP is running.  Recommend continue 5 mg if it's better,    Tdap (tetanus diphtheria pertussis) booster recommended if not received in previous 10 years. (please check and

## 2018-05-24 NOTE — PROGRESS NOTES
Clance Opitz is a 58year old female here for Patient presents with:   Well Adult: Physical no pap   HTN: BP readings have been high in the mornings   Imaging: Dexa scan - pt request       HPI:   Patient complains of here for well exam.     Generally f • Seborrheic dermatitis, unspecified    • Tachycardia, unspecified    • Thyrotoxicosis without mention of goiter or other cause, without mention of thyrotoxic crisis or storm    • Urinary retention    • Visual impairment     glasses   • Vitamin D deficie Rfl:    Cholecalciferol (VITAMIN D) 1000 UNITS Oral Cap Take  by mouth.  Disp:  Rfl:        Allergy:    Iodine (Topical)        HIVES    Comment:Caused small hives when given 30 years ago             \"allergy to contrast\"  Iodine [Radiology C*    HIVES vision, no eye discharge, itching or dryness. ENT: No earache or change in hearing. No nasal congestion, allergies, sinus pain, nosebleed or sore throat. Heme/lymph: No unusual bleeding or bruising, no lymph node enlargement or tenderness.   Endocrine: rate and rhythm without murmur S3 or S4.  GI: no distention, masses, organomegaly or tenderness. : deferred  MUSCULOSKELETAL: Back and extremities within normal limits  EXTREMITIES: no cyanosis, clubbing or edema.  Peripheral pulses normal.  NEURO: Nonfo recommend routine testing, but 1000 units daily is a common recommendation for daily intake at middle age. BMI is Body mass index is 24.48 kg/m². normal. Other habits:  To avoid harmful effects on health, we recommend that you not drink more than 1 alcoholi

## 2018-05-30 ENCOUNTER — HOSPITAL ENCOUNTER (OUTPATIENT)
Dept: BONE DENSITY | Age: 63
Discharge: HOME | End: 2018-05-30
Attending: FAMILY MEDICINE
Payer: COMMERCIAL

## 2018-05-30 DIAGNOSIS — M81.0 AGE-RELATED OSTEOPOROSIS WITHOUT CURRENT PATHOLOGICAL FRACTURE: ICD-10-CM

## 2018-05-30 PROCEDURE — 77080 DXA BONE DENSITY AXIAL: CPT | Performed by: FAMILY MEDICINE

## 2018-06-18 ENCOUNTER — TELEPHONE (OUTPATIENT)
Dept: FAMILY MEDICINE CLINIC | Facility: CLINIC | Age: 63
End: 2018-06-18

## 2018-06-18 DIAGNOSIS — I10 ESSENTIAL HYPERTENSION WITH GOAL BLOOD PRESSURE LESS THAN 140/90: ICD-10-CM

## 2018-06-18 RX ORDER — AMLODIPINE BESYLATE 5 MG/1
TABLET ORAL
Qty: 90 TABLET | Refills: 0 | Status: SHIPPED | OUTPATIENT
Start: 2018-06-18 | End: 2018-07-23

## 2018-06-18 NOTE — TELEPHONE ENCOUNTER
Patient called and stated that  increased medication to 5 mg for this medication at [facility]. However, the Pharmacy did not get the new dosage. Transferred Call to Triage.   Patient tried to get this straightened out with Pharmacy but needs us to

## 2018-07-01 DIAGNOSIS — I10 ESSENTIAL HYPERTENSION WITH GOAL BLOOD PRESSURE LESS THAN 140/90: ICD-10-CM

## 2018-07-02 RX ORDER — AMLODIPINE BESYLATE 2.5 MG/1
TABLET ORAL
Qty: 90 TABLET | Refills: 1 | OUTPATIENT
Start: 2018-07-02

## 2018-07-02 NOTE — TELEPHONE ENCOUNTER
LOV    LAST LAB    LAST RX   AmLODIPine Besylate 5 MG Oral Tab 90 tablet 0 6/18/2018         Next OV    PROTOCOL  Hypertension Medications Protocol Passed    Rx denied refill already done.

## 2018-07-23 ENCOUNTER — OFFICE VISIT (OUTPATIENT)
Dept: FAMILY MEDICINE CLINIC | Facility: CLINIC | Age: 63
End: 2018-07-23
Payer: COMMERCIAL

## 2018-07-23 VITALS
DIASTOLIC BLOOD PRESSURE: 68 MMHG | HEIGHT: 63 IN | HEART RATE: 76 BPM | BODY MASS INDEX: 24.8 KG/M2 | WEIGHT: 140 LBS | RESPIRATION RATE: 15 BRPM | OXYGEN SATURATION: 96 % | TEMPERATURE: 99 F | SYSTOLIC BLOOD PRESSURE: 126 MMHG

## 2018-07-23 DIAGNOSIS — Z51.81 MEDICATION MONITORING ENCOUNTER: ICD-10-CM

## 2018-07-23 DIAGNOSIS — I10 ESSENTIAL HYPERTENSION: ICD-10-CM

## 2018-07-23 DIAGNOSIS — I10 ESSENTIAL HYPERTENSION WITH GOAL BLOOD PRESSURE LESS THAN 140/90: ICD-10-CM

## 2018-07-23 DIAGNOSIS — M81.8 OTHER OSTEOPOROSIS WITHOUT CURRENT PATHOLOGICAL FRACTURE: Primary | ICD-10-CM

## 2018-07-23 PROCEDURE — 99213 OFFICE O/P EST LOW 20 MIN: CPT | Performed by: FAMILY MEDICINE

## 2018-07-23 RX ORDER — FOLIC ACID 1 MG/1
800 TABLET ORAL
COMMUNITY
Start: 2018-07-11 | End: 2018-10-25

## 2018-07-23 RX ORDER — AMLODIPINE BESYLATE 5 MG/1
TABLET ORAL
Qty: 90 TABLET | Refills: 1 | Status: SHIPPED | OUTPATIENT
Start: 2018-07-23 | End: 2019-03-12

## 2018-07-23 NOTE — PATIENT INSTRUCTIONS
Get basic metabolic panel soon before reclast and it will also indicate how much potassium you may need. Work with Dr. Juan Carlos Abdi on adjusting prednisone dose again. Continue regular exercise to try to combat osteoporosis.      Recheck on blood pressure in 3

## 2018-07-23 NOTE — PROGRESS NOTES
Breann Martinez IS A 58year old female [address] [place] Patient presents with:  HTN: F/U, Pt checks BP at home occ. History of present illness:     BP hasn't changed in changing 2.5 to 5 mg daily amlodipine.  Doesn't recall raynaud's in winter the past ye spine   • Osteoporosis, unspecified    • Other abnormal blood chemistry    • Other constipation    • Pain in joint, pelvic region and thigh    • Palpitations    • PONV (postoperative nausea and vomiting)    • Problems with swallowing    • Rheumatoid arthri Disp:  Rfl:    ESCITALOPRAM OXALATE OR Take 20 mg by mouth daily. Disp:  Rfl:    Magnesium 400 MG Oral Cap Take 400 mg by mouth daily. Disp:  Rfl:    Cholecalciferol (VITAMIN D) 1000 UNITS Oral Cap Take  by mouth.  Disp:  Rfl:        Allergy:        Iodin health. Review of systems:     Goes to fitness center 3x/week, plans to walk on days between.      Exam:     /68 (BP Location: Right arm, Patient Position: Sitting, Cuff Size: adult)   Pulse 76   Temp 98.5 °F (36.9 °C) (Oral)   Resp 15   Ht 63\"

## 2018-08-13 DIAGNOSIS — I10 ESSENTIAL HYPERTENSION WITH GOAL BLOOD PRESSURE LESS THAN 140/90: ICD-10-CM

## 2018-08-13 RX ORDER — CHLORTHALIDONE 25 MG/1
TABLET ORAL
Qty: 45 TABLET | Refills: 0 | Status: SHIPPED | OUTPATIENT
Start: 2018-08-13 | End: 2018-11-12

## 2018-08-13 NOTE — TELEPHONE ENCOUNTER
LOV 7/18    LAST LAB 7/18    LAST RX   chlorthalidone 25 MG Oral Tab 45 tablet 1 1/31/2018         Next OV 10/22/2018    PROTOCOL  Hypertension Medications Protocol Passed    Please advise on refill.  Thank You

## 2018-10-22 ENCOUNTER — OFFICE VISIT (OUTPATIENT)
Dept: FAMILY MEDICINE CLINIC | Facility: CLINIC | Age: 63
End: 2018-10-22
Payer: COMMERCIAL

## 2018-10-22 VITALS
HEIGHT: 63 IN | DIASTOLIC BLOOD PRESSURE: 76 MMHG | OXYGEN SATURATION: 96 % | SYSTOLIC BLOOD PRESSURE: 112 MMHG | HEART RATE: 86 BPM | BODY MASS INDEX: 24.95 KG/M2 | TEMPERATURE: 98 F | RESPIRATION RATE: 16 BRPM | WEIGHT: 140.81 LBS

## 2018-10-22 DIAGNOSIS — Z23 NEED FOR VACCINATION: ICD-10-CM

## 2018-10-22 DIAGNOSIS — Z82.49 FAMILY HISTORY OF CORONARY ARTERY DISEASE: ICD-10-CM

## 2018-10-22 DIAGNOSIS — I10 ESSENTIAL HYPERTENSION: ICD-10-CM

## 2018-10-22 DIAGNOSIS — E78.00 HYPERCHOLESTEROLEMIA: Primary | ICD-10-CM

## 2018-10-22 PROCEDURE — 99213 OFFICE O/P EST LOW 20 MIN: CPT | Performed by: FAMILY MEDICINE

## 2018-10-22 PROCEDURE — 90471 IMMUNIZATION ADMIN: CPT | Performed by: FAMILY MEDICINE

## 2018-10-22 PROCEDURE — 90686 IIV4 VACC NO PRSV 0.5 ML IM: CPT | Performed by: FAMILY MEDICINE

## 2018-10-22 NOTE — PROGRESS NOTES
Tasia Pagan IS A 61year old female [address] [facility] Patient presents with:  Chest Pain: No other symptoms   Fall: Roller skating        History of present illness: Had Reclast, first dose, had fever, bone pain, anorexia, x 36 hours.  seeing rheumatologis intolerance (pre-diabetes)    • High blood pressure    • Impaired fasting glucose    • Insomnia, unspecified    • Irritable bowel syndrome    • Migraine, unspecified, without mention of intractable migraine without mention of status migrainosus    • Carlos Alberto Mccurdy predniSONE 1 MG Oral Tab Take 2 tablets (2 mg total) by mouth daily.  (Patient taking differently: Take 10 mg by mouth daily.  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 Gait normal, no sacral tenderness. Lungs clear   Heart regular rhythm no S3 S4 murmur  Abdomen soft nontender  Affect appropriate.      Test res

## 2018-10-22 NOTE — PATIENT INSTRUCTIONS
Ultrafast CT scan for coronary calcium score ordered to gauge cardiovascular risk.     If higher risk, Recommend considering rosuvastatin 5 mg daily to 3 times a week as tolerated (muscle aches and higher A1c are possible with a statin)    Recheck lipids in

## 2018-10-23 RX ORDER — LANSOPRAZOLE 30 MG/1
CAPSULE, DELAYED RELEASE ORAL
Qty: 45 CAPSULE | Refills: 0 | Status: SHIPPED | OUTPATIENT
Start: 2018-10-23 | End: 2019-01-25

## 2018-10-23 NOTE — TELEPHONE ENCOUNTER
LOV yesterday    LAST LAB    LAST RX   LANSOPRAZOLE 30 MG Oral Capsule Delayed Release 45 capsule 0 3/15/2018    Sig :  TAKE ONE CAPSULE BY MOUTH 3 TIMES A WEEK           Next OV 10/22/2018    PROTOCOL  Please advise. No protocol. If filled.  Please close

## 2018-11-12 DIAGNOSIS — I10 ESSENTIAL HYPERTENSION WITH GOAL BLOOD PRESSURE LESS THAN 140/90: ICD-10-CM

## 2018-11-12 RX ORDER — CHLORTHALIDONE 25 MG/1
TABLET ORAL
Qty: 45 TABLET | Refills: 1 | Status: SHIPPED | OUTPATIENT
Start: 2018-11-12 | End: 2019-04-03 | Stop reason: ALTCHOICE

## 2018-11-12 NOTE — TELEPHONE ENCOUNTER
Name from pharmacy: CHLORTHALIDONE 25 [address] [facility] [place]          Will file in chart as: CHLORTHALIDONE 25 MG Oral Tab    Sig: TAKE HALF TABLET BY MOUTH DAILY    Disp:  45 tablet    Refills:  0    Start: 11/12/2018     Class: Normal    For: Essential hypertension w

## 2018-11-14 ENCOUNTER — TELEPHONE (OUTPATIENT)
Dept: FAMILY MEDICINE CLINIC | Facility: CLINIC | Age: 63
End: 2018-11-14

## 2018-11-14 NOTE — TELEPHONE ENCOUNTER
PATIENT LVM MSG  RE:  Cholesterol issues  She spoke to her Cardiologist and he's concerned about controversary surrounding Calcium Scoring. Would like her to start a Statin. Can Nurse or Dr. Trever Sanz call patient to discuss?

## 2018-11-14 NOTE — TELEPHONE ENCOUNTER
Do you have any reason why patient cant start Statin? Patient already saw Jenny Josue now she wants your opinion. Defer to Cardio?

## 2018-11-15 NOTE — TELEPHONE ENCOUNTER
No objection to starting statin if they feel it's necessary, she hasn't done a lipid panel I ordered a month ago.  Last one done by psych a year and a half ago was upper limits normal. IF BP is controlled and not diabetic, I don't think risk of heart diseas

## 2018-11-16 NOTE — TELEPHONE ENCOUNTER
No answer on home phone. Voicemail full on cell phone. AR LLChart message with 's answer to the patient's question.

## 2018-12-16 DIAGNOSIS — I10 ESSENTIAL HYPERTENSION WITH GOAL BLOOD PRESSURE LESS THAN 140/90: ICD-10-CM

## 2018-12-18 RX ORDER — POTASSIUM CHLORIDE 10 MEQ/1
TABLET, EXTENDED RELEASE ORAL
Qty: 180 TABLET | Refills: 1 | Status: SHIPPED | OUTPATIENT
Start: 2018-12-18 | End: 2019-01-21

## 2018-12-18 NOTE — TELEPHONE ENCOUNTER
LOV 10-22-18    LAST LAB    LAST RX    Next OV   Future Appointments   Date Time Provider Benjamin Rhea   1/10/2019 12:20 PM MD Jerome Menezes   1/21/2019  1:00 PM Penny Garcia MD [facility] IM         NO PROTOCOL  Name from [facility]

## 2019-01-17 ENCOUNTER — TELEPHONE (OUTPATIENT)
Dept: FAMILY MEDICINE CLINIC | Facility: CLINIC | Age: 64
End: 2019-01-17

## 2019-01-17 NOTE — TELEPHONE ENCOUNTER
Called patient and informed lab orders have been placed. Instructed to be fasting when labs are drawn as lipid panel has not been done yet. Instructed to drink water before lab draw to avoid dehydration.   Instructed to log into QBE as Dr. Jessica Cruz has

## 2019-01-18 ENCOUNTER — LAB ENCOUNTER (OUTPATIENT)
Dept: LAB | Age: 64
End: 2019-01-18
Attending: FAMILY MEDICINE
Payer: COMMERCIAL

## 2019-01-18 DIAGNOSIS — M81.0 SENILE OSTEOPOROSIS: ICD-10-CM

## 2019-01-18 DIAGNOSIS — E78.00 HYPERCHOLESTEROLEMIA: ICD-10-CM

## 2019-01-18 DIAGNOSIS — Z79.899 ENCOUNTER FOR LONG-TERM (CURRENT) USE OF HIGH-RISK MEDICATION: ICD-10-CM

## 2019-01-18 DIAGNOSIS — M05.79 RHEUMATOID ARTHRITIS INVOLVING MULTIPLE SITES WITH POSITIVE RHEUMATOID FACTOR (HCC): ICD-10-CM

## 2019-01-18 LAB
ALBUMIN SERPL-MCNC: 3.7 G/DL (ref 3.1–4.5)
ALP LIVER SERPL-CCNC: 46 U/L (ref 50–130)
ALT SERPL-CCNC: 23 U/L (ref 14–54)
AST SERPL-CCNC: 25 U/L (ref 15–41)
BASOPHILS # BLD AUTO: 0.03 X10(3) UL (ref 0–0.1)
BASOPHILS NFR BLD AUTO: 0.6 %
BILIRUB DIRECT SERPL-MCNC: <0.1 MG/DL (ref 0.1–0.5)
BILIRUB SERPL-MCNC: 0.3 MG/DL (ref 0.1–2)
CHOLEST SMN-MCNC: 235 MG/DL (ref ?–200)
CRP SERPL-MCNC: <0.29 MG/DL (ref ?–1)
EOSINOPHIL # BLD AUTO: 0.03 X10(3) UL (ref 0–0.3)
EOSINOPHIL NFR BLD AUTO: 0.6 %
ERYTHROCYTE [DISTWIDTH] IN BLOOD BY AUTOMATED COUNT: 13.2 % (ref 11.5–16)
HCT VFR BLD AUTO: 39.5 % (ref 34–50)
HDLC SERPL-MCNC: 54 MG/DL (ref 40–59)
HGB BLD-MCNC: 12.2 G/DL (ref 12–16)
IMMATURE GRANULOCYTE COUNT: 0.01 X10(3) UL (ref 0–1)
IMMATURE GRANULOCYTE RATIO %: 0.2 %
LDLC SERPL CALC-MCNC: 163 MG/DL (ref ?–100)
LYMPHOCYTES # BLD AUTO: 2.75 X10(3) UL (ref 0.9–4)
LYMPHOCYTES NFR BLD AUTO: 50.5 %
M PROTEIN MFR SERPL ELPH: 7.4 G/DL (ref 6.4–8.2)
MCH RBC QN AUTO: 23.3 PG (ref 27–33.2)
MCHC RBC AUTO-ENTMCNC: 30.9 G/DL (ref 31–37)
MCV RBC AUTO: 75.5 FL (ref 81–100)
MONOCYTES # BLD AUTO: 0.54 X10(3) UL (ref 0.1–1)
MONOCYTES NFR BLD AUTO: 9.9 %
NEUTROPHIL ABS PRELIM: 2.09 X10 (3) UL (ref 1.3–6.7)
NEUTROPHILS # BLD AUTO: 2.09 X10(3) UL (ref 1.3–6.7)
NEUTROPHILS NFR BLD AUTO: 38.2 %
NONHDLC SERPL-MCNC: 181 MG/DL (ref ?–130)
PLATELET # BLD AUTO: 277 10(3)UL (ref 150–450)
RBC # BLD AUTO: 5.23 X10(6)UL (ref 3.8–5.1)
RED CELL DISTRIBUTION WIDTH-SD: 35.5 FL (ref 35.1–46.3)
SED RATE-ML: 18 MM/HR (ref 0–25)
TRIGL SERPL-MCNC: 92 MG/DL (ref 30–149)
VLDLC SERPL CALC-MCNC: 18 MG/DL (ref 0–30)
WBC # BLD AUTO: 5.5 X10(3) UL (ref 4–13)

## 2019-01-18 PROCEDURE — 85025 COMPLETE CBC W/AUTO DIFF WBC: CPT

## 2019-01-18 PROCEDURE — 80061 LIPID PANEL: CPT

## 2019-01-18 PROCEDURE — 86140 C-REACTIVE PROTEIN: CPT

## 2019-01-18 PROCEDURE — 36415 COLL VENOUS BLD VENIPUNCTURE: CPT

## 2019-01-18 PROCEDURE — 83735 ASSAY OF MAGNESIUM: CPT | Performed by: FAMILY MEDICINE

## 2019-01-18 PROCEDURE — 80048 BASIC METABOLIC PNL TOTAL CA: CPT | Performed by: FAMILY MEDICINE

## 2019-01-18 PROCEDURE — 85652 RBC SED RATE AUTOMATED: CPT

## 2019-01-18 PROCEDURE — 80076 HEPATIC FUNCTION PANEL: CPT

## 2019-01-21 ENCOUNTER — OFFICE VISIT (OUTPATIENT)
Dept: FAMILY MEDICINE CLINIC | Facility: CLINIC | Age: 64
End: 2019-01-21
Payer: COMMERCIAL

## 2019-01-21 VITALS
HEART RATE: 100 BPM | HEIGHT: 63 IN | WEIGHT: 144.88 LBS | SYSTOLIC BLOOD PRESSURE: 138 MMHG | BODY MASS INDEX: 25.67 KG/M2 | RESPIRATION RATE: 15 BRPM | DIASTOLIC BLOOD PRESSURE: 87 MMHG | OXYGEN SATURATION: 98 % | TEMPERATURE: 98 F

## 2019-01-21 DIAGNOSIS — E87.6 HYPOKALEMIA: ICD-10-CM

## 2019-01-21 DIAGNOSIS — R19.6 HALITOSIS: ICD-10-CM

## 2019-01-21 DIAGNOSIS — E78.00 HYPERCHOLESTEROLEMIA: ICD-10-CM

## 2019-01-21 DIAGNOSIS — Z12.31 ENCOUNTER FOR SCREENING MAMMOGRAM FOR BREAST CANCER: ICD-10-CM

## 2019-01-21 DIAGNOSIS — I10 ESSENTIAL HYPERTENSION, BENIGN: Primary | ICD-10-CM

## 2019-01-21 PROCEDURE — 99214 OFFICE O/P EST MOD 30 MIN: CPT | Performed by: FAMILY MEDICINE

## 2019-01-21 RX ORDER — METRONIDAZOLE 500 MG/1
500 TABLET ORAL 2 TIMES DAILY
Qty: 14 TABLET | Refills: 0 | Status: SHIPPED | OUTPATIENT
Start: 2019-01-21 | End: 2019-06-04 | Stop reason: ALTCHOICE

## 2019-01-21 RX ORDER — ROSUVASTATIN CALCIUM 40 MG/1
40 TABLET, COATED ORAL NIGHTLY
COMMUNITY
End: 2019-04-03

## 2019-01-21 RX ORDER — ROSUVASTATIN CALCIUM 5 MG/1
5 TABLET, COATED ORAL NIGHTLY
Qty: 90 TABLET | Refills: 1 | Status: SHIPPED | OUTPATIENT
Start: 2019-01-21 | End: 2019-06-04 | Stop reason: SINTOL

## 2019-01-21 NOTE — PATIENT INSTRUCTIONS
Continue 2 pills twice daily on 10 meQ of K. Suggestion 5 mg daily rosuvastatin. Recheck lipids in 3 months.  (liver with rheumatology)

## 2019-01-21 NOTE — PROGRESS NOTES
Julien Beaulieu IS A 61year old female [address] [place] Patient presents with:  HTN: F/U        History of present illness:     Generally ok. Bad URI the last week. Was in bed 2 weekends ago, still some cough. Low grade fever. BP was higher.  150s-160s/100-1 unspecified, without mention of intractable migraine without mention of status migrainosus    • Osteoarthrosis, unspecified whether generalized or localized, other specified sites    • Osteopenia     lumbar spine   • Osteoporosis, unspecified    • Other ab BY MOUTH 3 TIMES A WEEK Disp: 45 capsule Rfl: 0   AmLODIPine Besylate 5 MG Oral Tab TAKE 1 TABLET BY MOUTH EVERY EVENING. Disp: 90 tablet Rfl: 1   QUEtiapine Fumarate 50 MG Oral Tab Take 50 mg by mouth.  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 Working with psychiatry. Test results:       Assessment & Plan:   Bucky Box was seen today for htn. Diagnoses and all orders for this visit:    Hypercholesterolemia  -     LIPID PANEL;  Future  -     LIPID PANEL    Encounter for screening m

## 2019-01-25 NOTE — TELEPHONE ENCOUNTER
LOV 1/19    LAST LAB 1/19    LAST RX   LANSOPRAZOLE 30 MG Oral Capsule Delayed Release 45 capsule 0 10/23/2018         Next OV 4/29/2019      PROTOCOL  Please advise. No protocol. If filled. Please close.    Thank You

## 2019-01-27 RX ORDER — LANSOPRAZOLE 30 MG/1
CAPSULE, DELAYED RELEASE ORAL
Qty: 45 CAPSULE | Refills: 0 | Status: SHIPPED | OUTPATIENT
Start: 2019-01-27 | End: 2019-04-24

## 2019-03-08 ENCOUNTER — TELEPHONE (OUTPATIENT)
Dept: FAMILY MEDICINE CLINIC | Facility: CLINIC | Age: 64
End: 2019-03-08

## 2019-03-08 NOTE — TELEPHONE ENCOUNTER
Patient is requesting labs to be ordered for frequent and painful muscle spasms would like a BMP tests ordered and any other tests that doctor had ordered previously by Dr. Buzz Santos. Patient would like to get them done todat if possible.  Please call her to l

## 2019-03-12 DIAGNOSIS — I10 ESSENTIAL HYPERTENSION WITH GOAL BLOOD PRESSURE LESS THAN 140/90: ICD-10-CM

## 2019-03-12 RX ORDER — AMLODIPINE BESYLATE 5 MG/1
TABLET ORAL
Qty: 90 TABLET | Refills: 0 | Status: SHIPPED | OUTPATIENT
Start: 2019-03-12 | End: 2019-06-04

## 2019-03-12 NOTE — TELEPHONE ENCOUNTER
LOV 1/19    LAST LAB 1/19    LAST RX   AmLODIPine Besylate 5 MG Oral Tab 90 tablet 1 7/23/2018    Sig :  TAKE 1 TABLET BY MOUTH EVERY EVENING. Next OV 4/29/2019      PROTOCOL  Hypertension Medications Protocol Passed    Refilled x 3 months.

## 2019-04-01 ENCOUNTER — LAB ENCOUNTER (OUTPATIENT)
Dept: LAB | Age: 64
End: 2019-04-01
Attending: FAMILY MEDICINE
Payer: COMMERCIAL

## 2019-04-01 DIAGNOSIS — Z79.899 ENCOUNTER FOR LONG-TERM (CURRENT) USE OF HIGH-RISK MEDICATION: ICD-10-CM

## 2019-04-01 DIAGNOSIS — M81.0 SENILE OSTEOPOROSIS: ICD-10-CM

## 2019-04-01 DIAGNOSIS — M05.79 RHEUMATOID ARTHRITIS INVOLVING MULTIPLE SITES WITH POSITIVE RHEUMATOID FACTOR (HCC): ICD-10-CM

## 2019-04-01 PROCEDURE — 86140 C-REACTIVE PROTEIN: CPT

## 2019-04-01 PROCEDURE — 80076 HEPATIC FUNCTION PANEL: CPT

## 2019-04-01 PROCEDURE — 36415 COLL VENOUS BLD VENIPUNCTURE: CPT | Performed by: FAMILY MEDICINE

## 2019-04-01 PROCEDURE — 85025 COMPLETE CBC W/AUTO DIFF WBC: CPT

## 2019-04-01 PROCEDURE — 85652 RBC SED RATE AUTOMATED: CPT

## 2019-04-01 PROCEDURE — 84100 ASSAY OF PHOSPHORUS: CPT | Performed by: FAMILY MEDICINE

## 2019-04-01 PROCEDURE — 80048 BASIC METABOLIC PNL TOTAL CA: CPT | Performed by: FAMILY MEDICINE

## 2019-04-01 PROCEDURE — 83735 ASSAY OF MAGNESIUM: CPT | Performed by: FAMILY MEDICINE

## 2019-04-01 PROCEDURE — 84132 ASSAY OF SERUM POTASSIUM: CPT | Performed by: FAMILY MEDICINE

## 2019-04-02 NOTE — PROGRESS NOTES
Released to [facility]. Dr. Dayami Lucas,     Please see attached results:    No lab evidence of rheumatologic drug toxicities. OK to continue. Overall your blood count, kidney and liver function are stable.      Your inflammatory markers are stable which ind

## 2019-04-03 ENCOUNTER — OFFICE VISIT (OUTPATIENT)
Dept: FAMILY MEDICINE CLINIC | Facility: CLINIC | Age: 64
End: 2019-04-03
Payer: COMMERCIAL

## 2019-04-03 VITALS
OXYGEN SATURATION: 92 % | WEIGHT: 140.81 LBS | SYSTOLIC BLOOD PRESSURE: 136 MMHG | HEART RATE: 94 BPM | DIASTOLIC BLOOD PRESSURE: 90 MMHG | TEMPERATURE: 98 F | RESPIRATION RATE: 15 BRPM | HEIGHT: 63 IN | BODY MASS INDEX: 24.95 KG/M2

## 2019-04-03 DIAGNOSIS — E87.6 HYPOKALEMIA: ICD-10-CM

## 2019-04-03 DIAGNOSIS — E83.39 SERUM PHOSPHORUS DECREASED: ICD-10-CM

## 2019-04-03 DIAGNOSIS — I10 ESSENTIAL HYPERTENSION: Primary | ICD-10-CM

## 2019-04-03 DIAGNOSIS — Z51.81 MEDICATION MONITORING ENCOUNTER: ICD-10-CM

## 2019-04-03 PROCEDURE — 99213 OFFICE O/P EST LOW 20 MIN: CPT | Performed by: FAMILY MEDICINE

## 2019-04-03 RX ORDER — ROSUVASTATIN CALCIUM 5 MG/1
5 TABLET, COATED ORAL EVERY OTHER DAY
Qty: 45 TABLET | Refills: 1 | Status: SHIPPED | OUTPATIENT
Start: 2019-04-03 | End: 2019-04-29

## 2019-04-03 RX ORDER — ENALAPRIL MALEATE AND HYDROCHLOROTHIAZIDE 10; 25 MG/1; MG/1
1 TABLET ORAL DAILY
Qty: 30 TABLET | Refills: 1 | Status: SHIPPED | OUTPATIENT
Start: 2019-04-03 | End: 2019-04-29 | Stop reason: ALTCHOICE

## 2019-04-03 NOTE — PROGRESS NOTES
Marques Lugo IS A 61year old female [address] [facility] [place] Patient presents with:  HTN: Elevated BP reading at home   Medication Problem: Statins   Lab Results: Go over lab results        History of present illness:     See also recent web messages from pt.      BP migrainosus    • Osteoarthrosis, unspecified whether generalized or localized, other specified sites    • Osteopenia     lumbar spine   • Osteoporosis, unspecified    • Other abnormal blood chemistry    • Other constipation    • Pain in joint, pelvic regio Hydroxychloroquine Sulfate (PLAQUENIL) 200 MG Oral Tab Take 1 tablet (200 mg total) by mouth daily.  Disp: 90 tablet Rfl: 0   predniSONE 5 MG Oral Tab 1 tab PO QD (Patient taking differently: 7 mg. 1 tab PO QD ) Disp: 90 tablet Rfl: 1   folic acid 1 MG Or physician        Comment: part-time visits    Social Needs      Financial resource strain: Not on file      Food insecurity:        Worry: Not on file        Inability: Not on file      Transportation needs:        Medical: Not on file        Non-medical: part time. Stress with elder parents, daughter, 's health. Review of systems:     All addressed in HPI.     Exam:     /90 (BP Location: Right arm, Patient Position: Sitting, Cuff Size: adult)   Pulse 94   Temp 98 °F (36.7 °C) (Oral)   Resp 04/01/2019 16.7    • Calcium, Total 04/01/2019 9.3    • Calculated Osmolality 04/01/2019 287    • GFR, Non- 04/01/2019 81    • GFR, -American 04/01/2019 94    • Magnesium 04/01/2019 2.3    • Phosphorus 04/01/2019 2.4*         Assessm

## 2019-04-03 NOTE — PATIENT INSTRUCTIONS
Enalapril 10-HCT 25 daily. Continue amlodipine. Cut to 20 meQ daily on potassium daily. Ankle sleeve. Consider PT or podiatry referral.     Try decrease rosuvastatin to 5 mg every other day.  Consider pravastatin 20 mg if lower dose rosuvastatin sharp

## 2019-04-05 DIAGNOSIS — I10 ESSENTIAL HYPERTENSION WITH GOAL BLOOD PRESSURE LESS THAN 140/90: ICD-10-CM

## 2019-04-05 RX ORDER — AMLODIPINE BESYLATE 5 MG/1
TABLET ORAL
Qty: 90 TABLET | Refills: 1 | OUTPATIENT
Start: 2019-04-05

## 2019-04-05 NOTE — TELEPHONE ENCOUNTER
LOV 4/19    LAST LAB    LAST RX   amLODIPine Besylate 5 MG Oral Tab 90 tablet 0 3/12/2019    Sig :  TAKE 1 TABLET BY MOUTH EVERY DAY IN THE EVENING           Next OV 4/29/2019      PROTOCOL  Hypertension Medications Protocol Passed    Rx denied refill too

## 2019-04-18 ENCOUNTER — TELEPHONE (OUTPATIENT)
Dept: FAMILY MEDICINE CLINIC | Facility: CLINIC | Age: 64
End: 2019-04-18

## 2019-04-18 NOTE — TELEPHONE ENCOUNTER
Pt called and left voicemail requesting about 5 tablets [facility] both [facility] she forgot her medicine at home, added [facility] to chart

## 2019-04-18 NOTE — TELEPHONE ENCOUNTER
Medication Detail     Medication Quantity Refills Start End   QUEtiapine Fumarate 50 MG Oral Tab         This medication is NOT given by Dr Una Lucas should be contacting local [facility] and asking for vacation override   [facility]

## 2019-04-24 RX ORDER — LANSOPRAZOLE 30 MG/1
CAPSULE, DELAYED RELEASE ORAL
Qty: 45 CAPSULE | Refills: 0 | Status: SHIPPED | OUTPATIENT
Start: 2019-04-24 | End: 2019-07-19

## 2019-04-24 NOTE — TELEPHONE ENCOUNTER
Name from pharmacy: LANSOPRAZOLE DR 30 MG CAPSULE          Will file in chart as: LANSOPRAZOLE 30 MG Oral Capsule Delayed Release    Sig: TAKE ONE CAPSULE BY MOUTH 3 TIMES A WEEK    Disp:  45 capsule    Refills:  0    Start: 4/24/2019    Class: Normal    R

## 2019-04-29 ENCOUNTER — OFFICE VISIT (OUTPATIENT)
Dept: FAMILY MEDICINE CLINIC | Facility: CLINIC | Age: 64
End: 2019-04-29
Payer: COMMERCIAL

## 2019-04-29 ENCOUNTER — APPOINTMENT (OUTPATIENT)
Dept: LAB | Age: 64
End: 2019-04-29
Attending: FAMILY MEDICINE
Payer: COMMERCIAL

## 2019-04-29 VITALS
DIASTOLIC BLOOD PRESSURE: 78 MMHG | TEMPERATURE: 98 F | BODY MASS INDEX: 24.8 KG/M2 | RESPIRATION RATE: 16 BRPM | OXYGEN SATURATION: 98 % | HEIGHT: 63 IN | HEART RATE: 84 BPM | WEIGHT: 140 LBS | SYSTOLIC BLOOD PRESSURE: 114 MMHG

## 2019-04-29 DIAGNOSIS — K52.9 ACUTE GASTROENTERITIS: Primary | ICD-10-CM

## 2019-04-29 DIAGNOSIS — E83.39 HYPOPHOSPHATEMIA: ICD-10-CM

## 2019-04-29 DIAGNOSIS — I10 ESSENTIAL HYPERTENSION: ICD-10-CM

## 2019-04-29 DIAGNOSIS — E87.6 HYPOKALEMIA: ICD-10-CM

## 2019-04-29 DIAGNOSIS — E83.39 SERUM PHOSPHORUS DECREASED: ICD-10-CM

## 2019-04-29 DIAGNOSIS — E78.00 HYPERCHOLESTEROLEMIA: ICD-10-CM

## 2019-04-29 DIAGNOSIS — Z51.81 MEDICATION MONITORING ENCOUNTER: ICD-10-CM

## 2019-04-29 DIAGNOSIS — K21.9 GASTROESOPHAGEAL REFLUX DISEASE, ESOPHAGITIS PRESENCE NOT SPECIFIED: ICD-10-CM

## 2019-04-29 PROCEDURE — 99214 OFFICE O/P EST MOD 30 MIN: CPT | Performed by: FAMILY MEDICINE

## 2019-04-29 PROCEDURE — 84100 ASSAY OF PHOSPHORUS: CPT

## 2019-04-29 PROCEDURE — 36415 COLL VENOUS BLD VENIPUNCTURE: CPT

## 2019-04-29 PROCEDURE — 80048 BASIC METABOLIC PNL TOTAL CA: CPT

## 2019-04-29 RX ORDER — LISINOPRIL AND HYDROCHLOROTHIAZIDE 25; 20 MG/1; MG/1
1 TABLET ORAL DAILY
Qty: 30 TABLET | Refills: 3 | Status: SHIPPED | OUTPATIENT
End: 2019-06-04

## 2019-04-29 RX ORDER — CETIRIZINE HYDROCHLORIDE 10 MG/1
10 TABLET ORAL AS NEEDED
COMMUNITY
End: 2019-06-04

## 2019-04-29 NOTE — PROGRESS NOTES
Rita Dumont IS A 61year old female [address] [facility] Patient presents with:  Medication Follow-Up: F/U        History of present illness:     Saw Luzma Pollard for 2nd time Tuesday (6 d  Ago), ate nearby at [facility] with [employer].  No other recent ea Enthesopathy of wrist and carpus    • Esophageal reflux    • Esophagitis, reflux    • Essential hypertension, benign    • GERD (gastroesophageal reflux disease)    • Glucose intolerance (pre-diabetes)    • High blood pressure    • Impaired fasting glucose 0   amLODIPine Besylate 5 MG Oral Tab TAKE 1 TABLET BY MOUTH EVERY DAY IN THE EVENING Disp: 90 tablet Rfl: 0   Rosuvastatin Calcium 5 MG Oral Tab Take 1 tablet (5 mg total) by mouth nightly.  (Patient taking differently: Take 5 mg by mouth 3 (three) times a Family History   Problem Relation Age of Onset   • Cancer Father         colon ca, colorectal ca   • Hypertension Father    • High Cholesterol Father    • Heart Attack Father    • Depression Mother    • Hypertension Mother    • Arthritis Mother    • daily and limited on coke         Occupational Exposure: No        Hobby Hazards: No        Sleep Concern: No        Stress Concern: Yes          parents, daughter's marriage, personal & spouse health        Weight Concern:  Yes          recent weight loss • Bilirubin, Total 04/01/2019 0.3    • Bilirubin, Direct 04/01/2019 <0.1    • Total Protein 04/01/2019 6.9    • Albumin 04/01/2019 3.7    • WBC 04/01/2019 6.0    • RBC 04/01/2019 5.15    • HGB 04/01/2019 12.4    • HCT 04/01/2019 38.6    • PLT 04/01/2019 Probiotic. Common probiotic brands: Florestor or Align, or generic. For blood pressure try lisinopril 20/HCTZ 25 1 pill daily, and amlodipine 5 mg at night, could increase to 10 mg if needed. Call if cough on lisinopril.       Increase back to potassium

## 2019-04-29 NOTE — PATIENT INSTRUCTIONS
Continue lansoprazole daily x 2 weeks. Nausea/diarrhea advice:     For gastroenteritis : We usually recommend trying to manage with diet adjustment first.  Jason Saravia white-flour starchy foods--bread, crackers, toast, dry cereal, rice--are best for a few day

## 2019-05-11 DIAGNOSIS — I10 ESSENTIAL HYPERTENSION WITH GOAL BLOOD PRESSURE LESS THAN 140/90: ICD-10-CM

## 2019-05-13 RX ORDER — CHLORTHALIDONE 25 MG/1
TABLET ORAL
Qty: 45 TABLET | Refills: 0 | Status: SHIPPED | OUTPATIENT
Start: 2019-05-13 | End: 2019-06-04

## 2019-05-13 NOTE — TELEPHONE ENCOUNTER
LOV 4/19    LAST LAB 4/19 Labs in 3 months and see me soon after.        LAST RX   CHLORTHALIDONE 25 MG Oral Tab (Discontinued) 45 tablet 1 11/12/2018 4/3/2019   Sig:   TAKE HALF TABLET BY MOUTH DAILY           Next OV 7/29/2019      PROTOCOL  Hypertension

## 2019-05-30 DIAGNOSIS — I10 ESSENTIAL HYPERTENSION WITH GOAL BLOOD PRESSURE LESS THAN 140/90: ICD-10-CM

## 2019-06-03 RX ORDER — AMLODIPINE BESYLATE 5 MG/1
TABLET ORAL
Qty: 90 TABLET | Refills: 0 | OUTPATIENT
Start: 2019-06-03

## 2019-06-03 NOTE — TELEPHONE ENCOUNTER
Future Appointments   Date Time Provider Benjamin Wilkerson   6/4/2019  1:30 PM Elva Rojas MD [facility]   7/23/2019  1:20 PM MD Christy Radford   7/29/2019  1:00 PM Elva Rojas MD [facility]     Rx denied refill a

## 2019-06-03 NOTE — TELEPHONE ENCOUNTER
Name from pharmacy: AMLODIPINE BESYLATE 5 MG TAB          Will file in chart as: AMLODIPINE BESYLATE 5 MG Oral Tab    Sig: TAKE 1 TABLET BY MOUTH EVERY DAY IN THE EVENING    Disp:  90 tablet    Refills:  0    Start: 5/30/2019    Class: Normal    For: Shaun

## 2019-06-04 ENCOUNTER — APPOINTMENT (OUTPATIENT)
Dept: LAB | Age: 64
End: 2019-06-04
Attending: FAMILY MEDICINE
Payer: COMMERCIAL

## 2019-06-04 ENCOUNTER — OFFICE VISIT (OUTPATIENT)
Dept: FAMILY MEDICINE CLINIC | Facility: CLINIC | Age: 64
End: 2019-06-04
Payer: COMMERCIAL

## 2019-06-04 VITALS
DIASTOLIC BLOOD PRESSURE: 74 MMHG | WEIGHT: 142.69 LBS | HEART RATE: 68 BPM | RESPIRATION RATE: 15 BRPM | HEIGHT: 63 IN | SYSTOLIC BLOOD PRESSURE: 116 MMHG | OXYGEN SATURATION: 93 % | TEMPERATURE: 99 F | BODY MASS INDEX: 25.28 KG/M2

## 2019-06-04 DIAGNOSIS — E83.39 HYPOPHOSPHATEMIA: ICD-10-CM

## 2019-06-04 DIAGNOSIS — E87.6 HYPOKALEMIA: ICD-10-CM

## 2019-06-04 DIAGNOSIS — I10 ESSENTIAL HYPERTENSION WITH GOAL BLOOD PRESSURE LESS THAN 140/90: ICD-10-CM

## 2019-06-04 DIAGNOSIS — J30.1 SEASONAL ALLERGIC RHINITIS DUE TO POLLEN: Primary | ICD-10-CM

## 2019-06-04 PROCEDURE — 80048 BASIC METABOLIC PNL TOTAL CA: CPT

## 2019-06-04 PROCEDURE — 84100 ASSAY OF PHOSPHORUS: CPT

## 2019-06-04 PROCEDURE — 99213 OFFICE O/P EST LOW 20 MIN: CPT | Performed by: FAMILY MEDICINE

## 2019-06-04 PROCEDURE — 36415 COLL VENOUS BLD VENIPUNCTURE: CPT

## 2019-06-04 RX ORDER — CLEMASTINE FUMARATE 1.34 MG
1 TABLET ORAL DAILY
Qty: 90 TABLET | Refills: 1 | Status: SHIPPED | OUTPATIENT
Start: 2019-06-04 | End: 2019-07-29 | Stop reason: ALTCHOICE

## 2019-06-04 RX ORDER — AMLODIPINE BESYLATE 5 MG/1
TABLET ORAL
Qty: 90 TABLET | Refills: 0 | Status: SHIPPED | OUTPATIENT
Start: 2019-06-04 | End: 2019-09-05

## 2019-06-04 RX ORDER — TRIAMTERENE AND HYDROCHLOROTHIAZIDE 37.5; 25 MG/1; MG/1
1 TABLET ORAL DAILY
Qty: 30 TABLET | Refills: 1 | Status: SHIPPED | OUTPATIENT
Start: 2019-06-04 | End: 2019-07-30

## 2019-06-04 NOTE — PROGRESS NOTES
Good Hester IS A 61year old female [address] [facility] Patient presents with:  Medication Follow-Up       History of present illness:     AM BP low 140s/low 90s.  Takes amlodipine at night, is on lisinopril HCT, had a cough before starting, was previously in lo constipation    • Pain in joint, pelvic region and thigh    • Palpitations    • PONV (postoperative nausea and vomiting)    • Problems with swallowing    • Rheumatoid arthritis (HCC)    • Schatzki's ring    • Seborrheic dermatitis, unspecified    • Tachyca 90 tablet Rfl: 1   folic acid 1 MG Oral Tab Take 1 tablet (1 mg total) by mouth daily. Disp: 90 tablet Rfl: 3   Zinc 50 MG Oral Cap Take 50 mg by mouth. Disp:  Rfl:    QUEtiapine Fumarate 50 MG Oral Tab Take 50 mg by mouth.  At Bedtime Disp:  Rfl: 2   Calci strain: Not on file      Food insecurity:        Worry: Not on file        Inability: Not on file      Transportation needs:        Medical: Not on file        Non-medical: Not on file    Tobacco Use      Smoking status: Never Smoker      Smokeless tobacco systems:     No chest pain or headache.  No calf cramps for a few weeks at least.     Exam:     /74 (BP Location: Left arm, Patient Position: Sitting, Cuff Size: adult)   Pulse 68   Temp 98.6 °F (37 °C) (Oral)   Resp 15   Ht 63\"   Wt 142 lb 11.2 oz

## 2019-06-04 NOTE — PATIENT INSTRUCTIONS
Stop lisinopril HCT    Start Dyazide (triamterene/HCTZ tablets) Continue K 10 meQ bid    Continue amlodipine 5 mg daily    Consider adding metoprolol succinate 25 mg daily at bedtime if you are still running over 140/90 in AM on BP.      Recheck in 4 weeks

## 2019-06-10 ENCOUNTER — HOSPITAL ENCOUNTER (OUTPATIENT)
Dept: MAMMOGRAPHY | Facility: HOSPITAL | Age: 64
Discharge: HOME | End: 2019-06-10
Attending: FAMILY MEDICINE
Payer: COMMERCIAL

## 2019-06-10 DIAGNOSIS — Z12.31 ENCOUNTER FOR SCREENING MAMMOGRAM FOR BREAST CANCER: ICD-10-CM

## 2019-06-10 PROCEDURE — 77067 SCR MAMMO BI INCL CAD: CPT | Performed by: FAMILY MEDICINE

## 2019-06-10 PROCEDURE — 77063 BREAST TOMOSYNTHESIS BI: CPT | Performed by: FAMILY MEDICINE

## 2019-06-11 NOTE — ED INITIAL ASSESSMENT (HPI)
Pt arrived to ED stating she wanted to go home. Pt states the statement she made to her daughter was a misunderstanding and denies suicidal ideation at this time.  Pt had to be encouraged to take clothes and remain in room - kept repeating I want to go home [FreeTextEntry1] : 63F s/p cabg/valve on IV anbx for endocarditis

## 2019-07-16 RX ORDER — ROSUVASTATIN CALCIUM 5 MG/1
TABLET, COATED ORAL
Qty: 90 TABLET | Refills: 1 | OUTPATIENT
Start: 2019-07-16

## 2019-07-16 NOTE — TELEPHONE ENCOUNTER
LOV    LAST LAB 1/18/19    LAST RX 4/3/19 x 6 moths    DENIED AS DUPLICATE, INSTRUCTIONS TO PHARMACY TO CHECK FOR NEW/ REFILLS      Next OV   Future Appointments   Date Time Provider Benjamin Wilkerson   7/23/2019  1:20 PM MD Beckie Angulo   7

## 2019-07-22 ENCOUNTER — LAB ENCOUNTER (OUTPATIENT)
Dept: LAB | Age: 64
End: 2019-07-22
Attending: INTERNAL MEDICINE
Payer: COMMERCIAL

## 2019-07-22 DIAGNOSIS — M05.79 RHEUMATOID ARTHRITIS INVOLVING MULTIPLE SITES WITH POSITIVE RHEUMATOID FACTOR (HCC): ICD-10-CM

## 2019-07-22 DIAGNOSIS — Z79.899 ENCOUNTER FOR LONG-TERM (CURRENT) USE OF HIGH-RISK MEDICATION: ICD-10-CM

## 2019-07-22 DIAGNOSIS — E78.00 HYPERCHOLESTEROLEMIA: ICD-10-CM

## 2019-07-22 DIAGNOSIS — M81.0 SENILE OSTEOPOROSIS: ICD-10-CM

## 2019-07-22 LAB
ALBUMIN SERPL-MCNC: 3.8 G/DL (ref 3.4–5)
ALP LIVER SERPL-CCNC: 50 U/L (ref 50–130)
ALT SERPL-CCNC: 18 U/L (ref 13–56)
AST SERPL-CCNC: 17 U/L (ref 15–37)
BASOPHILS # BLD AUTO: 0.04 X10(3) UL (ref 0–0.2)
BASOPHILS NFR BLD AUTO: 0.8 %
BILIRUB DIRECT SERPL-MCNC: <0.1 MG/DL (ref 0–0.2)
BILIRUB SERPL-MCNC: 0.3 MG/DL (ref 0.1–2)
BUN BLD-MCNC: 18 MG/DL (ref 7–18)
CHOLEST SMN-MCNC: 240 MG/DL (ref ?–200)
CREAT BLD-MCNC: 0.87 MG/DL (ref 0.55–1.02)
CRP SERPL-MCNC: <0.29 MG/DL (ref ?–0.3)
DEPRECATED RDW RBC AUTO: 36.9 FL (ref 35.1–46.3)
EOSINOPHIL # BLD AUTO: 0.05 X10(3) UL (ref 0–0.7)
EOSINOPHIL NFR BLD AUTO: 1 %
ERYTHROCYTE [DISTWIDTH] IN BLOOD BY AUTOMATED COUNT: 13.8 % (ref 11–15)
HCT VFR BLD AUTO: 39.7 % (ref 35–48)
HDLC SERPL-MCNC: 54 MG/DL (ref 40–59)
HGB BLD-MCNC: 12.2 G/DL (ref 12–16)
IMM GRANULOCYTES # BLD AUTO: 0.01 X10(3) UL (ref 0–1)
IMM GRANULOCYTES NFR BLD: 0.2 %
LDLC SERPL CALC-MCNC: 170 MG/DL (ref ?–100)
LYMPHOCYTES # BLD AUTO: 2.61 X10(3) UL (ref 1–4)
LYMPHOCYTES NFR BLD AUTO: 52 %
M PROTEIN MFR SERPL ELPH: 7.3 G/DL (ref 6.4–8.2)
MCH RBC QN AUTO: 23.5 PG (ref 26–34)
MCHC RBC AUTO-ENTMCNC: 30.7 G/DL (ref 31–37)
MCV RBC AUTO: 76.3 FL (ref 80–100)
MONOCYTES # BLD AUTO: 0.63 X10(3) UL (ref 0.1–1)
MONOCYTES NFR BLD AUTO: 12.5 %
NEUTROPHILS # BLD AUTO: 1.68 X10 (3) UL (ref 1.5–7.7)
NEUTROPHILS # BLD AUTO: 1.68 X10(3) UL (ref 1.5–7.7)
NEUTROPHILS NFR BLD AUTO: 33.5 %
NONHDLC SERPL-MCNC: 186 MG/DL (ref ?–130)
PLATELET # BLD AUTO: 261 10(3)UL (ref 150–450)
RBC # BLD AUTO: 5.2 X10(6)UL (ref 3.8–5.3)
SED RATE-ML: 10 MM/HR (ref 0–25)
TRIGL SERPL-MCNC: 79 MG/DL (ref 30–149)
VLDLC SERPL CALC-MCNC: 16 MG/DL (ref 0–30)
WBC # BLD AUTO: 5 X10(3) UL (ref 4–11)

## 2019-07-22 PROCEDURE — 86140 C-REACTIVE PROTEIN: CPT

## 2019-07-22 PROCEDURE — 82565 ASSAY OF CREATININE: CPT

## 2019-07-22 PROCEDURE — 84520 ASSAY OF UREA NITROGEN: CPT

## 2019-07-22 PROCEDURE — 36415 COLL VENOUS BLD VENIPUNCTURE: CPT

## 2019-07-22 PROCEDURE — 80061 LIPID PANEL: CPT

## 2019-07-22 PROCEDURE — 85025 COMPLETE CBC W/AUTO DIFF WBC: CPT

## 2019-07-22 PROCEDURE — 85652 RBC SED RATE AUTOMATED: CPT

## 2019-07-22 PROCEDURE — 80076 HEPATIC FUNCTION PANEL: CPT

## 2019-07-22 NOTE — TELEPHONE ENCOUNTER
LOV 6/19    LAST LAB 7/19    LAST RX   LANSOPRAZOLE 30 MG Oral Capsule Delayed Release 45 capsule 0 4/24/2019    Sig:   TAKE ONE CAPSULE BY MOUTH 3 TIMES A WEEK     Patient taking differently:   one capsule daily           Next OV 7/29/2019      PROTOCOL

## 2019-07-23 RX ORDER — LANSOPRAZOLE 30 MG/1
CAPSULE, DELAYED RELEASE ORAL
Qty: 45 CAPSULE | Refills: 1 | Status: SHIPPED | OUTPATIENT
Start: 2019-07-23 | End: 2020-02-04

## 2019-07-25 NOTE — TELEPHONE ENCOUNTER
LOV 6/19    LAST LAB 6/19    LAST RX   Triamterene-HCTZ 37.5-25 MG Oral Tab 30 tablet 1 6/4/2019         Next OV 7/29/2019      PROTOCOL  Hypertension Medications Protocol Passed    Hold has upcoming appt. Has enough until appt.

## 2019-07-29 ENCOUNTER — OFFICE VISIT (OUTPATIENT)
Dept: FAMILY MEDICINE CLINIC | Facility: CLINIC | Age: 64
End: 2019-07-29
Payer: COMMERCIAL

## 2019-07-29 VITALS
BODY MASS INDEX: 25.34 KG/M2 | HEART RATE: 70 BPM | SYSTOLIC BLOOD PRESSURE: 122 MMHG | TEMPERATURE: 97 F | RESPIRATION RATE: 16 BRPM | HEIGHT: 63 IN | WEIGHT: 143 LBS | OXYGEN SATURATION: 97 % | DIASTOLIC BLOOD PRESSURE: 74 MMHG

## 2019-07-29 DIAGNOSIS — E78.00 HYPERCHOLESTEROLEMIA: Primary | ICD-10-CM

## 2019-07-29 PROCEDURE — 99214 OFFICE O/P EST MOD 30 MIN: CPT | Performed by: FAMILY MEDICINE

## 2019-07-29 RX ORDER — ROSUVASTATIN CALCIUM 5 MG/1
5 TABLET, COATED ORAL NIGHTLY
Qty: 30 TABLET | Refills: 1 | Status: SHIPPED | OUTPATIENT
Start: 2019-07-29 | End: 2019-10-09

## 2019-07-29 NOTE — PATIENT INSTRUCTIONS
Continue current BP meds, check your cuff against ours next visit, I suspect your readings at home are about 10 points higher than here. Let us know if headaches get more frequent than current 2-week intervals.      Could consider a beta blocker, carved

## 2019-07-29 NOTE — PROGRESS NOTES
Coy Chisholm IS A 61year old female [address] [place] Patient presents with: Follow - Up: patient is here for 3 month follow up and also needs refills       History of present illness:     BP in AMs 120s-130s/90s.  Has new manual BP cuff, electronic ones run mention of status migrainosus    • Osteoarthrosis, unspecified whether generalized or localized, other specified sites    • Osteopenia     lumbar spine   • Osteoporosis, unspecified    • Other abnormal blood chemistry    • Other constipation    • Pain in j (Patient taking differently: Take 10 mEq by mouth 2 (two) times daily.  ) Disp: 360 tablet Rfl: 1   Hydroxychloroquine Sulfate (PLAQUENIL) 200 MG Oral Tab Take 1 tablet (200 mg total) by mouth daily.  Disp: 90 tablet Rfl: 0   predniSONE 5 MG Oral Tab 1 tab 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 Stress with elder parents, daughter, 's health. Review of systems:     No GI problems. No  symptoms. Fairly stable with psych    Father turned 80, mother 80.      E Granulocyte % 07/22/2019 0.2            Assessment & Plan:   Alysia Mendez was seen today for follow - up. Diagnoses and all orders for this visit:    Hypercholesterolemia  -     Cancel: LIPID PANEL;  Future  -     Cancel: LIPID PANEL  -     LIPID PANEL; Fut

## 2019-07-30 RX ORDER — TRIAMTERENE AND HYDROCHLOROTHIAZIDE 37.5; 25 MG/1; MG/1
1 TABLET ORAL DAILY
Qty: 30 TABLET | Refills: 1 | Status: SHIPPED | OUTPATIENT
Start: 2019-07-30 | End: 2019-08-31

## 2019-08-09 ENCOUNTER — TELEPHONE (OUTPATIENT)
Dept: FAMILY MEDICINE CLINIC | Facility: CLINIC | Age: 64
End: 2019-08-09

## 2019-08-09 NOTE — TELEPHONE ENCOUNTER
Patient said her pharmacy needs 90 day supply for medication it is not covered under 30 day      Triamterene-HCTZ 37.5-25 MG Oral Tab    Patient is leaving out of town today she is going to pay for the 30 days but she needs 90 from know on .

## 2019-09-03 RX ORDER — TRIAMTERENE AND HYDROCHLOROTHIAZIDE 37.5; 25 MG/1; MG/1
TABLET ORAL
Qty: 30 TABLET | Refills: 1 | Status: SHIPPED | OUTPATIENT
Start: 2019-09-03 | End: 2019-09-06

## 2019-09-03 NOTE — TELEPHONE ENCOUNTER
LOV 7/19    LAST LAB 6/19    LAST RX   Triamterene-HCTZ 37.5-25 MG Oral Tab 30 tablet 1 7/30/2019         Next OV Visit date not found      PROTOCOL  Hypertension Medications Protocol Passed    Please advise on refill.  Thank You

## 2019-09-05 DIAGNOSIS — I10 ESSENTIAL HYPERTENSION WITH GOAL BLOOD PRESSURE LESS THAN 140/90: ICD-10-CM

## 2019-09-05 RX ORDER — AMLODIPINE BESYLATE 5 MG/1
TABLET ORAL
Qty: 90 TABLET | Refills: 0 | Status: SHIPPED | OUTPATIENT
Start: 2019-09-05 | End: 2019-11-27

## 2019-09-05 NOTE — TELEPHONE ENCOUNTER
LOV 7/19    LAST LAB 6/19    LAST RX   amLODIPine Besylate 5 MG Oral Tab 90 tablet 0 6/4/2019       Next OV Visit date not found      PROTOCOL  Hypertension Medications Protocol Passed    Refilled x 3 months.

## 2019-09-06 RX ORDER — TRIAMTERENE AND HYDROCHLOROTHIAZIDE 37.5; 25 MG/1; MG/1
1 TABLET ORAL
Qty: 90 TABLET | Refills: 0 | Status: SHIPPED | OUTPATIENT
Start: 2019-09-06 | End: 2019-10-09

## 2019-10-01 RX ORDER — POTASSIUM CHLORIDE 750 MG/1
TABLET, EXTENDED RELEASE ORAL
Qty: 180 TABLET | Refills: 1 | Status: SHIPPED | OUTPATIENT
Start: 2019-10-01 | End: 2020-02-04

## 2019-10-01 NOTE — TELEPHONE ENCOUNTER
LOV  7-29-19      1-19-19    [facility] lab   6-14-19   K+ 3.7    Please approve or deny refill

## 2019-10-08 ENCOUNTER — APPOINTMENT (OUTPATIENT)
Dept: LAB | Age: 64
End: 2019-10-08
Attending: FAMILY MEDICINE
Payer: COMMERCIAL

## 2019-10-08 DIAGNOSIS — E78.00 HYPERCHOLESTEROLEMIA: ICD-10-CM

## 2019-10-08 PROCEDURE — 80061 LIPID PANEL: CPT

## 2019-10-08 PROCEDURE — 36415 COLL VENOUS BLD VENIPUNCTURE: CPT

## 2019-10-09 ENCOUNTER — OFFICE VISIT (OUTPATIENT)
Dept: FAMILY MEDICINE CLINIC | Facility: CLINIC | Age: 64
End: 2019-10-09
Payer: COMMERCIAL

## 2019-10-09 VITALS
HEIGHT: 63 IN | HEART RATE: 80 BPM | OXYGEN SATURATION: 98 % | BODY MASS INDEX: 25.52 KG/M2 | SYSTOLIC BLOOD PRESSURE: 110 MMHG | WEIGHT: 144 LBS | DIASTOLIC BLOOD PRESSURE: 68 MMHG | RESPIRATION RATE: 16 BRPM | TEMPERATURE: 98 F

## 2019-10-09 DIAGNOSIS — Z78.0 POSTMENOPAUSAL: ICD-10-CM

## 2019-10-09 DIAGNOSIS — Z51.81 MEDICATION MONITORING ENCOUNTER: ICD-10-CM

## 2019-10-09 DIAGNOSIS — I10 ESSENTIAL HYPERTENSION: Primary | ICD-10-CM

## 2019-10-09 DIAGNOSIS — Z23 NEED FOR VACCINATION: ICD-10-CM

## 2019-10-09 PROCEDURE — 90471 IMMUNIZATION ADMIN: CPT | Performed by: FAMILY MEDICINE

## 2019-10-09 PROCEDURE — 90686 IIV4 VACC NO PRSV 0.5 ML IM: CPT | Performed by: FAMILY MEDICINE

## 2019-10-09 PROCEDURE — 99214 OFFICE O/P EST MOD 30 MIN: CPT | Performed by: FAMILY MEDICINE

## 2019-10-09 RX ORDER — ROSUVASTATIN CALCIUM 5 MG/1
5 TABLET, COATED ORAL NIGHTLY
Qty: 90 TABLET | Refills: 1 | Status: SHIPPED | OUTPATIENT
Start: 2019-10-09 | End: 2020-03-30

## 2019-10-09 RX ORDER — TRIAMTERENE AND HYDROCHLOROTHIAZIDE 37.5; 25 MG/1; MG/1
1 TABLET ORAL
Qty: 90 TABLET | Refills: 0 | Status: SHIPPED | OUTPATIENT
Start: 2019-10-09 | End: 2020-03-03

## 2019-10-09 RX ORDER — AMLODIPINE BESYLATE 2.5 MG/1
2.5 TABLET ORAL DAILY
Qty: 90 TABLET | Refills: 1 | Status: SHIPPED | OUTPATIENT
Start: 2019-10-09 | End: 2020-03-30

## 2019-10-09 NOTE — PROGRESS NOTES
Heath Owen IS A 59year old female [address] [facility] Patient presents with:  Hypertension: follow up       History of present illness:     Generally feeling well. Taking rosuvastatin 5 mg tiw. Tolerating without myalgias.  Can try to increase to 4-5 days/week Osteopenia     lumbar spine   • Osteoporosis, unspecified    • Other abnormal blood chemistry    • Other constipation    • Pain in joint, pelvic region and thigh    • Palpitations    • PONV (postoperative nausea and vomiting)    • Problems with swallowing Rfl: 1   Coenzyme Q10 (COQ-10 OR) Take 200 mg by mouth 2 (two) times daily. Disp:  Rfl:    Probiotic Product (PROBIOTIC DAILY OR) Take by mouth.  Disp:  Rfl:    Hydroxychloroquine Sulfate (PLAQUENIL) 200 MG Oral Tab Take 1 tablet (200 mg total) by mouth farhad physician        Comment: part-time visits    Social Needs      Financial resource strain: Not on file      Food insecurity:        Worry: Not on file        Inability: Not on file      Transportation needs:        Medical: Not on file        Non-medical: visits part time. Stress with elder parents, daughter, 's health. Review of systems:     Seeing psych & counselor  Prednisone 7 mg/day per Dr. Benjy Sewell. Considering change to plaquenil, was fearful of depression from it.  Had some insomnia with it af

## 2019-10-09 NOTE — PATIENT INSTRUCTIONS
Increase rosuvastatin to 4-5 days/week as tolerated. Continue Dyazide. Increase amlodipine to 7.5 mg total daily. Check DEXA late November. Recheck January. Physical & refills then.

## 2019-10-18 ENCOUNTER — PATIENT MESSAGE (OUTPATIENT)
Dept: FAMILY MEDICINE CLINIC | Facility: CLINIC | Age: 64
End: 2019-10-18

## 2019-10-18 DIAGNOSIS — M25.562 LEFT MEDIAL KNEE PAIN: Primary | ICD-10-CM

## 2019-10-21 NOTE — TELEPHONE ENCOUNTER
Dr. Sandra Guerin,  Please advise    LOV   10/9/19    OV note:  hurt L knee with tennis, pain & swelling at the time, now still has swelling still proximally and occasional pain.  Pain more medial. Walking 3 blocks to grandson's school was difficult when she tried

## 2019-10-21 NOTE — TELEPHONE ENCOUNTER
From: Coy Chisholm  To: Heather Overton MD  Sent: 10/18/2019 4:30 PM CDT  Subject: Non-Urgent Alessandro Roth:  Would you please consider ordering a left knee X-ray for initial evaluation of medial pain and swelling.  The problem

## 2019-10-22 ENCOUNTER — HOSPITAL ENCOUNTER (OUTPATIENT)
Dept: GENERAL RADIOLOGY | Facility: HOSPITAL | Age: 64
Discharge: HOME | End: 2019-10-22
Attending: FAMILY MEDICINE
Payer: COMMERCIAL

## 2019-10-22 ENCOUNTER — LAB ENCOUNTER (OUTPATIENT)
Dept: LAB | Facility: HOSPITAL | Age: 64
End: 2019-10-22
Attending: FAMILY MEDICINE
Payer: COMMERCIAL

## 2019-10-22 DIAGNOSIS — Z79.899 ENCOUNTER FOR LONG-TERM (CURRENT) USE OF HIGH-RISK MEDICATION: ICD-10-CM

## 2019-10-22 DIAGNOSIS — I10 ESSENTIAL HYPERTENSION: ICD-10-CM

## 2019-10-22 DIAGNOSIS — M25.562 LEFT MEDIAL KNEE PAIN: ICD-10-CM

## 2019-10-22 DIAGNOSIS — M81.0 SENILE OSTEOPOROSIS: ICD-10-CM

## 2019-10-22 DIAGNOSIS — Z51.81 MEDICATION MONITORING ENCOUNTER: ICD-10-CM

## 2019-10-22 DIAGNOSIS — M05.79 RHEUMATOID ARTHRITIS INVOLVING MULTIPLE SITES WITH POSITIVE RHEUMATOID FACTOR (HCC): ICD-10-CM

## 2019-10-22 PROCEDURE — 86140 C-REACTIVE PROTEIN: CPT

## 2019-10-22 PROCEDURE — 73562 X-RAY EXAM OF KNEE 3: CPT | Performed by: FAMILY MEDICINE

## 2019-10-22 PROCEDURE — 85652 RBC SED RATE AUTOMATED: CPT

## 2019-10-22 PROCEDURE — 85025 COMPLETE CBC W/AUTO DIFF WBC: CPT

## 2019-10-22 PROCEDURE — 36415 COLL VENOUS BLD VENIPUNCTURE: CPT

## 2019-10-22 PROCEDURE — 80053 COMPREHEN METABOLIC PANEL: CPT

## 2019-10-22 PROCEDURE — 82248 BILIRUBIN DIRECT: CPT

## 2019-10-22 NOTE — PROGRESS NOTES
Will discuss results with pt at follow up visit.   Future Appointments  10/28/2019 1:20 PM    MD Rachel De Leon  1/6/2020   1:00 PM    Gunjan Hsieh MD      [facility]

## 2019-10-25 ENCOUNTER — TELEPHONE (OUTPATIENT)
Dept: ORTHOPEDICS CLINIC | Facility: CLINIC | Age: 64
End: 2019-10-25

## 2019-10-25 DIAGNOSIS — M25.562 LEFT KNEE PAIN, UNSPECIFIED CHRONICITY: Primary | ICD-10-CM

## 2019-10-25 NOTE — TELEPHONE ENCOUNTER
Pt is being referred by Dr. Rosalio Parekh for left knee pain and swelling. Pt had non weight bearing x-rays on 10.21.19.  Please advise if additional imaging is required

## 2019-10-28 ENCOUNTER — HOSPITAL ENCOUNTER (OUTPATIENT)
Dept: GENERAL RADIOLOGY | Facility: HOSPITAL | Age: 64
Discharge: HOME | End: 2019-10-28
Attending: ORTHOPAEDIC SURGERY
Payer: COMMERCIAL

## 2019-10-28 DIAGNOSIS — M25.562 LEFT KNEE PAIN, UNSPECIFIED CHRONICITY: ICD-10-CM

## 2019-10-28 PROCEDURE — 73564 X-RAY EXAM KNEE 4 OR MORE: CPT | Performed by: ORTHOPAEDIC SURGERY

## 2019-10-29 ENCOUNTER — OFFICE VISIT (OUTPATIENT)
Dept: ORTHOPEDICS CLINIC | Facility: CLINIC | Age: 64
End: 2019-10-29
Payer: COMMERCIAL

## 2019-10-29 VITALS
TEMPERATURE: 98 F | HEIGHT: 63 IN | BODY MASS INDEX: 25.52 KG/M2 | HEART RATE: 81 BPM | OXYGEN SATURATION: 99 % | WEIGHT: 144 LBS | RESPIRATION RATE: 14 BRPM

## 2019-10-29 DIAGNOSIS — M25.562 ACUTE PAIN OF LEFT KNEE: Primary | ICD-10-CM

## 2019-10-29 PROCEDURE — 99203 OFFICE O/P NEW LOW 30 MIN: CPT | Performed by: ORTHOPAEDIC SURGERY

## 2019-10-29 NOTE — H&P
[facility] New Patient Note    CC: Patient presents with:  Consult: Left knee pain and swelling started 2 months ago playing tennis. She was running for a ball and she felt a tweak. Most pain is on the medial side. Patient denies radiaton.  Pain is mo hypertension, benign    • GERD (gastroesophageal reflux disease)    • Glucose intolerance (pre-diabetes)    • High blood pressure    • Impaired fasting glucose    • Insomnia, unspecified    • Irritable bowel syndrome    • Migraine, unspecified, without men DAY IN THE EVENING, Disp: 90 tablet, Rfl: 0  predniSONE 1 MG Oral Tab, Take 2 tablets (2 mg total) by mouth daily. 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 Highest on the differential would be concern for a meniscal injury.

## 2019-11-15 ENCOUNTER — HOSPITAL ENCOUNTER (OUTPATIENT)
Dept: MRI IMAGING | Facility: HOSPITAL | Age: 64
Discharge: HOME | End: 2019-11-15
Attending: ORTHOPAEDIC SURGERY
Payer: COMMERCIAL

## 2019-11-15 DIAGNOSIS — M25.562 ACUTE PAIN OF LEFT KNEE: ICD-10-CM

## 2019-11-15 PROCEDURE — 73721 MRI JNT OF LWR EXTRE W/O DYE: CPT | Performed by: ORTHOPAEDIC SURGERY

## 2019-11-21 ENCOUNTER — OFFICE VISIT (OUTPATIENT)
Dept: ORTHOPEDICS CLINIC | Facility: CLINIC | Age: 64
End: 2019-11-21
Payer: COMMERCIAL

## 2019-11-21 VITALS
HEART RATE: 102 BPM | OXYGEN SATURATION: 98 % | BODY MASS INDEX: 25.52 KG/M2 | WEIGHT: 144 LBS | RESPIRATION RATE: 14 BRPM | HEIGHT: 63 IN

## 2019-11-21 DIAGNOSIS — M25.562 ACUTE PAIN OF LEFT KNEE: Primary | ICD-10-CM

## 2019-11-21 PROCEDURE — 99213 OFFICE O/P EST LOW 20 MIN: CPT | Performed by: ORTHOPAEDIC SURGERY

## 2019-11-21 NOTE — PROGRESS NOTES
[facility] Progress Note    Subjective: Patient returns to discuss results of MRI. Symptoms in the knee have been unchanged since last visit. Pain is mostly medially about the knee.   There is no popping, clicking or locking new since her injury (sh strengthening and stretching, and an external prescription was provided for this. We did discuss the possibility of corticosteroid injection to the knee, with possible aspiration of fluid.   Patient feels that this is not necessary at this time, and she wo

## 2019-11-27 DIAGNOSIS — I10 ESSENTIAL HYPERTENSION WITH GOAL BLOOD PRESSURE LESS THAN 140/90: ICD-10-CM

## 2019-11-27 RX ORDER — AMLODIPINE BESYLATE 5 MG/1
TABLET ORAL
Qty: 90 TABLET | Refills: 0 | Status: SHIPPED | OUTPATIENT
Start: 2019-11-27 | End: 2020-02-24

## 2019-12-01 ENCOUNTER — MED REC SCAN ONLY (OUTPATIENT)
Dept: ORTHOPEDICS CLINIC | Facility: CLINIC | Age: 64
End: 2019-12-01

## 2020-01-01 ENCOUNTER — MED REC SCAN ONLY (OUTPATIENT)
Dept: ORTHOPEDICS CLINIC | Facility: CLINIC | Age: 65
End: 2020-01-01

## 2020-01-13 ENCOUNTER — HOSPITAL ENCOUNTER (OUTPATIENT)
Dept: BONE DENSITY | Age: 65
Discharge: HOME | End: 2020-01-13
Attending: FAMILY MEDICINE
Payer: COMMERCIAL

## 2020-01-13 DIAGNOSIS — Z78.0 POSTMENOPAUSAL: ICD-10-CM

## 2020-01-13 PROCEDURE — 77080 DXA BONE DENSITY AXIAL: CPT | Performed by: FAMILY MEDICINE

## 2020-01-24 ENCOUNTER — OFFICE VISIT (OUTPATIENT)
Dept: ORTHOPEDICS CLINIC | Facility: CLINIC | Age: 65
End: 2020-01-24
Payer: COMMERCIAL

## 2020-01-24 VITALS
RESPIRATION RATE: 16 BRPM | BODY MASS INDEX: 25.52 KG/M2 | OXYGEN SATURATION: 98 % | HEIGHT: 63 IN | HEART RATE: 92 BPM | WEIGHT: 144 LBS

## 2020-01-24 DIAGNOSIS — M25.562 LEFT KNEE PAIN, UNSPECIFIED CHRONICITY: Primary | ICD-10-CM

## 2020-01-24 PROCEDURE — 99213 OFFICE O/P EST LOW 20 MIN: CPT | Performed by: ORTHOPAEDIC SURGERY

## 2020-01-24 NOTE — PROGRESS NOTES
[facility] Progress Note    Subjective: Patient returns to discuss left knee. She states that her symptoms have not changed much. She does have some decreased pain with physical therapy, however the swelling is persistent.   She does not have much i this plan and all questions were answered. The above note was creating using Dragon speech recognition technology. Please excuse any typos.     Zeferino Null MD  [facility] Orthopedic Surgery

## 2020-02-01 ENCOUNTER — MED REC SCAN ONLY (OUTPATIENT)
Dept: ORTHOPEDICS CLINIC | Facility: CLINIC | Age: 65
End: 2020-02-01

## 2020-02-04 ENCOUNTER — OFFICE VISIT (OUTPATIENT)
Dept: FAMILY MEDICINE CLINIC | Facility: CLINIC | Age: 65
End: 2020-02-04
Payer: COMMERCIAL

## 2020-02-04 VITALS
BODY MASS INDEX: 25.87 KG/M2 | WEIGHT: 146 LBS | RESPIRATION RATE: 16 BRPM | OXYGEN SATURATION: 98 % | SYSTOLIC BLOOD PRESSURE: 112 MMHG | DIASTOLIC BLOOD PRESSURE: 72 MMHG | HEIGHT: 63 IN | TEMPERATURE: 97 F | HEART RATE: 100 BPM

## 2020-02-04 DIAGNOSIS — Z00.00 WELL ADULT EXAM: Primary | ICD-10-CM

## 2020-02-04 DIAGNOSIS — E87.6 HYPOKALEMIA: ICD-10-CM

## 2020-02-04 DIAGNOSIS — Z12.31 ENCOUNTER FOR SCREENING MAMMOGRAM FOR BREAST CANCER: ICD-10-CM

## 2020-02-04 DIAGNOSIS — Z79.899 ENCOUNTER FOR LONG-TERM (CURRENT) USE OF HIGH-RISK MEDICATION: ICD-10-CM

## 2020-02-04 DIAGNOSIS — I10 ESSENTIAL HYPERTENSION: ICD-10-CM

## 2020-02-04 DIAGNOSIS — M05.79 RHEUMATOID ARTHRITIS INVOLVING MULTIPLE SITES WITH POSITIVE RHEUMATOID FACTOR (HCC): ICD-10-CM

## 2020-02-04 DIAGNOSIS — M81.0 SENILE OSTEOPOROSIS: ICD-10-CM

## 2020-02-04 PROCEDURE — 99213 OFFICE O/P EST LOW 20 MIN: CPT | Performed by: FAMILY MEDICINE

## 2020-02-04 PROCEDURE — 99396 PREV VISIT EST AGE 40-64: CPT | Performed by: FAMILY MEDICINE

## 2020-02-04 RX ORDER — POTASSIUM CHLORIDE 750 MG/1
20 TABLET, EXTENDED RELEASE ORAL 2 TIMES DAILY
Qty: 360 TABLET | Refills: 1 | Status: SHIPPED | OUTPATIENT
Start: 2020-02-04 | End: 2020-03-30

## 2020-02-04 RX ORDER — LANSOPRAZOLE 30 MG/1
CAPSULE, DELAYED RELEASE ORAL
Qty: 45 CAPSULE | Refills: 1 | Status: SHIPPED | OUTPATIENT
Start: 2020-02-04 | End: 2020-08-03

## 2020-02-04 NOTE — PATIENT INSTRUCTIONS
Meryl, ask Dr. Bing Mejia to order. Tdap (tetanus diphtheria pertussis) booster recommended if not received in previous 10 years.  Over 50, recommend Shingrix, shingles vaccine 2 doses 2 to 6 months apart, 98% effective but there is about 16% risk of arm swe women beginning at age 39 and women without symptoms at any age who are overweight or obese and have 1 or more additional risk factors for diabetes.  At  least every 3 years   Type 2 diabetes or prediabetes All women diagnosed with gestational diabetes Life All women At routine exams   Lung cancer Adults age 54 to [de-identified] who have smoked Yearly screening in smokers with 30 pack-year history of smoking or who quit within 15 years   Obesity All women in this age group At routine exams   Osteoporosis Women who are po (protects against 23 types of pneumococcal bacteria)   Tetanus/diphtheria/pertussis (Td/Tdap) booster All women in this age group Td every 10 years, or a one-time dose of Tdap instead of a Td booster after age 25, then Td every 10 years   Zoster All women

## 2020-02-04 NOTE — PROGRESS NOTES
Yue Muñiz is a 59year old female here for Patient presents with: Well Adult      HPI:   Patient complains of here for well exam.     Doing fairly well. Osteoporosis, had infusion 2018, had DEXA improved.  Had reaction to Reclast, 36 hours [facility]    • Schatzki's ring    • Seborrheic dermatitis, unspecified    • Tachycardia, unspecified    • Thyrotoxicosis without mention of goiter or other cause, without mention of thyrotoxic crisis or storm    • Urinary retention    • Visual impair QUEtiapine Fumarate 50 MG Oral Tab Take 50 mg by mouth. At Bedtime  2   • Calcium Carbonate-Vitamin D (CALCIUM-D) 600-400 MG-UNIT Oral Tab Take 1 tablet by mouth 3 (three) times a week.      • Biotin 1000 MCG Oral Tab Take 1,000 mcg by mouth 3 (three) times Minutes per session: Not on file      Stress: Not on file    Relationships      Social connections:        Talks on phone: Not on file        Gets together: Not on file        Attends [employer] service: Not on file        Active member of club or Carol lb (66.2 kg)   SpO2 98%   BMI 25.86 kg/m²   Body mass index is 25.86 kg/m².    GENERAL: well developed, well nourished,in no apparent distress  SKIN: no rashes,no suspicious lesions  EYES:PERRLA, EOMI, conjunctiva are clear   HEENT: atraumatic, normocephali 56 U/L Final   • Alkaline Phosphatase 10/22/2019 50  50 - 130 U/L Final   • Bilirubin, Total 10/22/2019 0.2  0.1 - 2.0 mg/dL Final   • Total Protein 10/22/2019 7.4  6.4 - 8.2 g/dL Final   • Albumin 10/22/2019 3.8  3.4 - 5.0 g/dL Final   • Globulin  10/22/2 Meryl, ask Dr. Kirk Cortez to order. Tdap (tetanus diphtheria pertussis) booster recommended if not received in previous 10 years.  Over 50, recommend Shingrix, shingles vaccine 2 doses 2 to 6 months apart, 98% effective but there is about 16% risk of arm Prevention Guidelines, Women Ages 48 to 59  Screening tests and vaccines are an important part of managing your health. A screening test is done to find possible disorders or diseases in people who don't have any symptoms.  The goal is to find a disease e infection At routine exams   Colorectal cancer All women in this age group Flexible sigmoidoscopy every 5 years, or colonoscopy every 10 years, or double-contrast barium enema every 5 years; yearly fecal occult blood test or fecal immunochemical test; or a 4 months after the first dose   Haemophilus influenzaeType B (HIB) Women at increased risk for infection – talk with your healthcare provider 1 to 3 doses   Influenza (flu) All women in this age group Once a year   Measles, mumps, rubella (MMR) Women in th rights reserved. This information is not intended as a substitute for professional medical care. Always follow your healthcare professional's instructions. Recommend return for annual adult well exam in 1 year.

## 2020-02-10 ENCOUNTER — OFFICE VISIT (OUTPATIENT)
Dept: ORTHOPEDICS CLINIC | Facility: CLINIC | Age: 65
End: 2020-02-10
Payer: COMMERCIAL

## 2020-02-10 VITALS
SYSTOLIC BLOOD PRESSURE: 112 MMHG | DIASTOLIC BLOOD PRESSURE: 82 MMHG | BODY MASS INDEX: 25.16 KG/M2 | HEART RATE: 86 BPM | WEIGHT: 142 LBS | OXYGEN SATURATION: 99 % | RESPIRATION RATE: 16 BRPM | HEIGHT: 63 IN

## 2020-02-10 DIAGNOSIS — M25.562 PAIN AND SWELLING OF LEFT KNEE: Primary | ICD-10-CM

## 2020-02-10 DIAGNOSIS — M25.462 PAIN AND SWELLING OF LEFT KNEE: Primary | ICD-10-CM

## 2020-02-10 LAB
BASOPHIL SYNOVIAL FLUID: 0 %
CRYSTALS: NEGATIVE
EOSINOPHIL SYNOVIAL FLUID: 0 %
LYMPH SYNOVIAL FLUID: 86 %
MON/MAC/HIST SYNOVIAL FLUID: 8 %
NEUTROPHIL SYNOVIAL FLUID: 6 % (ref ?–20)
RBC SYNOVIAL FLUID: <3000 /MM3
TOTAL CELLS COUNTED: 100
WBC SYNOVIAL FLUID: 219 /MM3 (ref ?–150)

## 2020-02-10 PROCEDURE — 89051 BODY FLUID CELL COUNT: CPT | Performed by: ORTHOPAEDIC SURGERY

## 2020-02-10 PROCEDURE — 87070 CULTURE OTHR SPECIMN AEROBIC: CPT | Performed by: ORTHOPAEDIC SURGERY

## 2020-02-10 PROCEDURE — 89060 EXAM SYNOVIAL FLUID CRYSTALS: CPT | Performed by: ORTHOPAEDIC SURGERY

## 2020-02-10 PROCEDURE — 20610 DRAIN/INJ JOINT/BURSA W/O US: CPT | Performed by: ORTHOPAEDIC SURGERY

## 2020-02-10 PROCEDURE — 87205 SMEAR GRAM STAIN: CPT | Performed by: ORTHOPAEDIC SURGERY

## 2020-02-10 RX ORDER — TRIAMCINOLONE ACETONIDE 40 MG/ML
40 INJECTION, SUSPENSION INTRA-ARTICULAR; INTRAMUSCULAR ONCE
Status: COMPLETED | OUTPATIENT
Start: 2020-02-10 | End: 2020-02-10

## 2020-02-10 RX ADMIN — TRIAMCINOLONE ACETONIDE 40 MG: 40 INJECTION, SUSPENSION INTRA-ARTICULAR; INTRAMUSCULAR at 09:38:00

## 2020-02-10 NOTE — PROCEDURES
After informed consent, the patient's left knee was marked and prepped with antiseptic solution. Local anesthetic was used to create a skin wheal near the superolateral aspiration site.   It was reprepped with antiseptic solution, and an 18-gauge needle wa

## 2020-02-10 NOTE — PROGRESS NOTES
Per Dr. Jed Solano, draw up 4 mL of 0.5% Marcaine and 1 mL of Kenalog 40 for injection to left knee. SL  Patient provided education handout for cortisone injection.

## 2020-02-12 ENCOUNTER — TELEPHONE (OUTPATIENT)
Dept: ORTHOPEDICS CLINIC | Facility: CLINIC | Age: 65
End: 2020-02-12

## 2020-02-12 NOTE — TELEPHONE ENCOUNTER
Called pt to discuss results of aspiration - negative for inflammation, more consistent with normal/OA/or trauma. She has had great relief of pain and swelling from the injection.  Advised to \"ride out\" the injection, if it provides long lasting relief

## 2020-02-22 DIAGNOSIS — I10 ESSENTIAL HYPERTENSION WITH GOAL BLOOD PRESSURE LESS THAN 140/90: ICD-10-CM

## 2020-02-24 RX ORDER — AMLODIPINE BESYLATE 5 MG/1
TABLET ORAL
Qty: 90 TABLET | Refills: 0 | Status: SHIPPED | OUTPATIENT
Start: 2020-02-24 | End: 2020-05-26

## 2020-02-24 NOTE — TELEPHONE ENCOUNTER
LOV  2/20    LAST LAB 10/20    LAST RX   AMLODIPINE BESYLATE 5 MG Oral Tab 90 tablet 0 11/27/2019       Next OV 8/3/2020      PROTOCOL  Hypertension Medications Protocol Passed     Refilled x 3 months.

## 2020-03-03 RX ORDER — TRIAMTERENE AND HYDROCHLOROTHIAZIDE 37.5; 25 MG/1; MG/1
TABLET ORAL
Qty: 90 TABLET | Refills: 1 | Status: SHIPPED | OUTPATIENT
Start: 2020-03-03 | End: 2020-08-03

## 2020-03-05 ENCOUNTER — PATIENT MESSAGE (OUTPATIENT)
Dept: FAMILY MEDICINE CLINIC | Facility: CLINIC | Age: 65
End: 2020-03-05

## 2020-03-05 NOTE — TELEPHONE ENCOUNTER
Please order Reclast through infusion center. She had a BMP ordered in FEb. 29, she should get that before the scheduled infusion.

## 2020-03-05 NOTE — TELEPHONE ENCOUNTER
From: Amaya Villegas  To: Katarzyna Peck MD  Sent: 3/5/2020 5:04 AM CST  Subject: Referral Request    I have a question about DEXA Scan resulted on 1/13/20, 10:53 AM.    Yes, I'm willing to go through Reclast therapy again since I obtained a signific

## 2020-03-06 NOTE — TELEPHONE ENCOUNTER
Dr Juwan Story / Gretta Salas has been ordering this for the patient. You need to place the order. Give to MA to fax when form is done.

## 2020-03-11 ENCOUNTER — APPOINTMENT (OUTPATIENT)
Dept: LAB | Age: 65
End: 2020-03-11
Attending: FAMILY MEDICINE
Payer: COMMERCIAL

## 2020-03-11 DIAGNOSIS — R25.2 MUSCLE CRAMPS: ICD-10-CM

## 2020-03-11 DIAGNOSIS — E78.00 HYPERCHOLESTEREMIA: ICD-10-CM

## 2020-03-11 DIAGNOSIS — I10 ESSENTIAL HYPERTENSION, BENIGN: ICD-10-CM

## 2020-03-11 LAB
ANION GAP SERPL CALC-SCNC: 4 MMOL/L (ref 0–18)
BUN BLD-MCNC: 11 MG/DL (ref 7–18)
BUN/CREAT SERPL: 13.8 (ref 10–20)
CALCIUM BLD-MCNC: 8.9 MG/DL (ref 8.5–10.1)
CHLORIDE SERPL-SCNC: 106 MMOL/L (ref 98–112)
CHOLEST SMN-MCNC: 175 MG/DL (ref ?–200)
CO2 SERPL-SCNC: 26 MMOL/L (ref 21–32)
CREAT BLD-MCNC: 0.8 MG/DL (ref 0.55–1.02)
GLUCOSE BLD-MCNC: 94 MG/DL (ref 70–99)
HAV IGM SER QL: 2 MG/DL (ref 1.6–2.6)
HDLC SERPL-MCNC: 63 MG/DL (ref 40–59)
LDLC SERPL CALC-MCNC: 98 MG/DL (ref ?–100)
NONHDLC SERPL-MCNC: 112 MG/DL (ref ?–130)
OSMOLALITY SERPL CALC.SUM OF ELEC: 281 MOSM/KG (ref 275–295)
PATIENT FASTING Y/N/NP: YES
PATIENT FASTING Y/N/NP: YES
PHOSPHATE SERPL-MCNC: 2.8 MG/DL (ref 2.5–4.9)
POTASSIUM SERPL-SCNC: 3.8 MMOL/L (ref 3.5–5.1)
SODIUM SERPL-SCNC: 136 MMOL/L (ref 136–145)
TRIGL SERPL-MCNC: 69 MG/DL (ref 30–149)
VLDLC SERPL CALC-MCNC: 14 MG/DL (ref 0–30)

## 2020-03-11 PROCEDURE — 80061 LIPID PANEL: CPT

## 2020-03-11 PROCEDURE — 83735 ASSAY OF MAGNESIUM: CPT

## 2020-03-11 PROCEDURE — 80048 BASIC METABOLIC PNL TOTAL CA: CPT

## 2020-03-11 PROCEDURE — 84100 ASSAY OF PHOSPHORUS: CPT

## 2020-03-11 PROCEDURE — 36415 COLL VENOUS BLD VENIPUNCTURE: CPT

## 2020-03-13 ENCOUNTER — TELEPHONE (OUTPATIENT)
Dept: HEMATOLOGY/ONCOLOGY | Facility: HOSPITAL | Age: 65
End: 2020-03-13

## 2020-03-30 RX ORDER — ROSUVASTATIN CALCIUM 5 MG/1
TABLET, COATED ORAL
Qty: 90 TABLET | Refills: 1 | Status: SHIPPED | OUTPATIENT
Start: 2020-03-30 | End: 2020-08-03

## 2020-03-30 RX ORDER — POTASSIUM CHLORIDE 10 MEQ/1
TABLET, EXTENDED RELEASE ORAL
Qty: 180 TABLET | Refills: 1 | Status: SHIPPED | OUTPATIENT
Start: 2020-03-30 | End: 2020-08-03

## 2020-03-30 RX ORDER — AMLODIPINE BESYLATE 2.5 MG/1
2.5 TABLET ORAL DAILY
Qty: 90 TABLET | Refills: 1 | Status: SHIPPED | OUTPATIENT
Start: 2020-03-30 | End: 2020-08-03

## 2020-05-02 ENCOUNTER — PATIENT MESSAGE (OUTPATIENT)
Dept: FAMILY MEDICINE CLINIC | Facility: CLINIC | Age: 65
End: 2020-05-02

## 2020-05-04 RX ORDER — CLEMASTINE FUMARATE 1.34 MG
1.34 TABLET ORAL 2 TIMES DAILY
Qty: 180 TABLET | Refills: 1 | Status: SHIPPED | OUTPATIENT
Start: 2020-05-04 | End: 2020-05-08

## 2020-05-04 NOTE — TELEPHONE ENCOUNTER
From: Amaya Villegas  To: Katarzyna Peck MD  Sent: 5/2/2020 1:19 PM CDT  Subject: Prescription Question    Hello: This message is sent to request a prescription for the anti-histamine Clemastine Fumarate 1.34 mg BID prn allergy symptoms.  I used this

## 2020-05-07 NOTE — TELEPHONE ENCOUNTER
I sent an rx for it on May 4. Check the med list. She needs to check with pharmacy if it's been ordered and when it might arrive. I sent 90 days. It's not a commonly used med so it might need to be special ordered from  with a delay.

## 2020-05-08 RX ORDER — CLEMASTINE FUMARATE 1.34 MG
1.34 TABLET ORAL 2 TIMES DAILY
Qty: 180 TABLET | Refills: 1 | Status: SHIPPED | OUTPATIENT
Start: 2020-05-08 | End: 2020-08-03 | Stop reason: ALTCHOICE

## 2020-05-08 NOTE — ED NOTES
First contact with patient. Pt is sitting with , calmly at this time. Finished dinner is at bedside. [facility] contacted [facility] to interview patient as to whether or not certain hospitals would be acceptable for inpatient transfer.  Patient and  Saud Wang Patient/EMS

## 2020-05-08 NOTE — TELEPHONE ENCOUNTER
[facility] stated they do not have the 1.34 dosage that was ordered. They have 2.68. Can they fill for patient and instruct pt to take 1/2 pill?

## 2020-05-08 NOTE — TELEPHONE ENCOUNTER
[facility]  Pt asked it be sent to [facility].  I have ordered and sent to [facility] in [address] sent back to patient with this info

## 2020-05-11 ENCOUNTER — TELEPHONE (OUTPATIENT)
Dept: FAMILY MEDICINE CLINIC | Facility: CLINIC | Age: 65
End: 2020-05-11

## 2020-05-11 NOTE — TELEPHONE ENCOUNTER
Spoke with the patient - she said the pharmacy is ordering the medication and the patient is waiting for the pharmacy to call that they have it in and she is able to pick it up. Pt will call us back if something more is needed.

## 2020-05-11 NOTE — TELEPHONE ENCOUNTER
This is not availabe. Clemastine Fumarate 1.34 MG Oral Tab 180 tablet 1 5/8/2020    Sig:   Take 1.34 mg by mouth 2 (two) times a day. Can she have 2.68mg take 1/2 tab BID?

## 2020-05-12 RX ORDER — CLEMASTINE FUMARATE 2.68 MG
1.34 TABLET ORAL 2 TIMES DAILY
Qty: 90 TABLET | Refills: 1 | Status: SHIPPED | OUTPATIENT
Start: 2020-05-12 | End: 2020-06-11

## 2020-05-12 NOTE — TELEPHONE ENCOUNTER
I approved this last week. Yes, the pharmacy can give her that. I entered the rx but don't know where she wants it sent.

## 2020-05-23 DIAGNOSIS — I10 ESSENTIAL HYPERTENSION WITH GOAL BLOOD PRESSURE LESS THAN 140/90: ICD-10-CM

## 2020-05-26 RX ORDER — AMLODIPINE BESYLATE 5 MG/1
TABLET ORAL
Qty: 90 TABLET | Refills: 0 | Status: SHIPPED | OUTPATIENT
Start: 2020-05-26 | End: 2020-08-03

## 2020-05-26 NOTE — TELEPHONE ENCOUNTER
LOV 2/4/2020    LAST LAB 10/22/2019    LAST RX   AMLODIPINE BESYLATE 2.5 MG Oral Tab 90 tablet 1 3/30/2020    Sig:   TAKE 1 TABLET (2.5 MG TOTAL) BY MOUTH DAILY.  ADD TO 5 MG TABLETS           Next OV   Future Appointments   Date Time Provider Department Ce

## 2020-07-28 ENCOUNTER — LAB ENCOUNTER (OUTPATIENT)
Dept: LAB | Age: 65
End: 2020-07-28
Attending: INTERNAL MEDICINE
Payer: COMMERCIAL

## 2020-07-28 DIAGNOSIS — Z79.899 ENCOUNTER FOR LONG-TERM (CURRENT) USE OF HIGH-RISK MEDICATION: ICD-10-CM

## 2020-07-28 DIAGNOSIS — M05.79 RHEUMATOID ARTHRITIS INVOLVING MULTIPLE SITES WITH POSITIVE RHEUMATOID FACTOR (HCC): ICD-10-CM

## 2020-07-28 LAB
ALBUMIN SERPL-MCNC: 3.8 G/DL (ref 3.4–5)
ALP LIVER SERPL-CCNC: 53 U/L (ref 50–130)
ALT SERPL-CCNC: 22 U/L (ref 13–56)
AST SERPL-CCNC: 15 U/L (ref 15–37)
BASOPHILS # BLD AUTO: 0.05 X10(3) UL (ref 0–0.2)
BASOPHILS NFR BLD AUTO: 1 %
BILIRUB DIRECT SERPL-MCNC: <0.1 MG/DL (ref 0–0.2)
BILIRUB SERPL-MCNC: 0.2 MG/DL (ref 0.1–2)
BUN BLD-MCNC: 9 MG/DL (ref 7–18)
CREAT BLD-MCNC: 0.79 MG/DL (ref 0.55–1.02)
CRP SERPL-MCNC: <0.29 MG/DL (ref ?–0.3)
DEPRECATED RDW RBC AUTO: 38.2 FL (ref 35.1–46.3)
EOSINOPHIL # BLD AUTO: 0.02 X10(3) UL (ref 0–0.7)
EOSINOPHIL NFR BLD AUTO: 0.4 %
ERYTHROCYTE [DISTWIDTH] IN BLOOD BY AUTOMATED COUNT: 13.5 % (ref 11–15)
HCT VFR BLD AUTO: 39 % (ref 35–48)
HGB BLD-MCNC: 11.5 G/DL (ref 12–16)
IMM GRANULOCYTES # BLD AUTO: 0 X10(3) UL (ref 0–1)
IMM GRANULOCYTES NFR BLD: 0 %
LYMPHOCYTES # BLD AUTO: 2.45 X10(3) UL (ref 1–4)
LYMPHOCYTES NFR BLD AUTO: 51.1 %
M PROTEIN MFR SERPL ELPH: 7.1 G/DL (ref 6.4–8.2)
MCH RBC QN AUTO: 23.1 PG (ref 26–34)
MCHC RBC AUTO-ENTMCNC: 29.5 G/DL (ref 31–37)
MCV RBC AUTO: 78.3 FL (ref 80–100)
MONOCYTES # BLD AUTO: 0.57 X10(3) UL (ref 0.1–1)
MONOCYTES NFR BLD AUTO: 11.9 %
NEUTROPHILS # BLD AUTO: 1.7 X10 (3) UL (ref 1.5–7.7)
NEUTROPHILS # BLD AUTO: 1.7 X10(3) UL (ref 1.5–7.7)
NEUTROPHILS NFR BLD AUTO: 35.6 %
PLATELET # BLD AUTO: 227 10(3)UL (ref 150–450)
RBC # BLD AUTO: 4.98 X10(6)UL (ref 3.8–5.3)
SED RATE-ML: 8 MM/HR (ref 0–25)
WBC # BLD AUTO: 4.8 X10(3) UL (ref 4–11)

## 2020-07-28 PROCEDURE — 80076 HEPATIC FUNCTION PANEL: CPT

## 2020-07-28 PROCEDURE — 85652 RBC SED RATE AUTOMATED: CPT

## 2020-07-28 PROCEDURE — 85025 COMPLETE CBC W/AUTO DIFF WBC: CPT

## 2020-07-28 PROCEDURE — 82565 ASSAY OF CREATININE: CPT

## 2020-07-28 PROCEDURE — 86140 C-REACTIVE PROTEIN: CPT

## 2020-07-28 PROCEDURE — 84520 ASSAY OF UREA NITROGEN: CPT

## 2020-07-28 PROCEDURE — 36415 COLL VENOUS BLD VENIPUNCTURE: CPT

## 2020-07-28 NOTE — PROGRESS NOTES
Released to [facility]. Dr. John Chin,     Please see attached results:    No lab evidence of rheumatologic drug toxicities. OK to continue. Overall your kidney and liver function are stable.      Your inflammatory markers are stable which indicate stable

## 2020-08-03 ENCOUNTER — OFFICE VISIT (OUTPATIENT)
Dept: FAMILY MEDICINE CLINIC | Facility: CLINIC | Age: 65
End: 2020-08-03
Payer: MEDICARE

## 2020-08-03 VITALS
OXYGEN SATURATION: 98 % | TEMPERATURE: 97 F | DIASTOLIC BLOOD PRESSURE: 82 MMHG | WEIGHT: 148 LBS | HEIGHT: 63 IN | HEART RATE: 82 BPM | SYSTOLIC BLOOD PRESSURE: 112 MMHG | BODY MASS INDEX: 26.22 KG/M2 | RESPIRATION RATE: 18 BRPM

## 2020-08-03 DIAGNOSIS — E78.00 HYPERCHOLESTEROLEMIA: ICD-10-CM

## 2020-08-03 DIAGNOSIS — I10 ESSENTIAL HYPERTENSION WITH GOAL BLOOD PRESSURE LESS THAN 140/90: ICD-10-CM

## 2020-08-03 DIAGNOSIS — K21.9 GASTROESOPHAGEAL REFLUX DISEASE WITHOUT ESOPHAGITIS: Primary | ICD-10-CM

## 2020-08-03 PROCEDURE — 99213 OFFICE O/P EST LOW 20 MIN: CPT | Performed by: FAMILY MEDICINE

## 2020-08-03 RX ORDER — LANSOPRAZOLE 30 MG/1
CAPSULE, DELAYED RELEASE ORAL
Qty: 45 CAPSULE | Refills: 1 | Status: SHIPPED | OUTPATIENT
Start: 2020-08-03 | End: 2021-01-11

## 2020-08-03 RX ORDER — TRIAMTERENE AND HYDROCHLOROTHIAZIDE 37.5; 25 MG/1; MG/1
1 TABLET ORAL DAILY
Qty: 90 TABLET | Refills: 1 | Status: SHIPPED | OUTPATIENT
Start: 2020-08-03 | End: 2021-02-03

## 2020-08-03 RX ORDER — POTASSIUM CHLORIDE 750 MG/1
10 TABLET, EXTENDED RELEASE ORAL 2 TIMES DAILY
Qty: 180 TABLET | Refills: 1 | Status: SHIPPED | OUTPATIENT
Start: 2020-08-03 | End: 2021-02-03

## 2020-08-03 RX ORDER — AMLODIPINE BESYLATE 2.5 MG/1
2.5 TABLET ORAL DAILY
Qty: 90 TABLET | Refills: 1 | Status: SHIPPED | OUTPATIENT
Start: 2020-08-03 | End: 2021-02-03

## 2020-08-03 RX ORDER — AMLODIPINE BESYLATE 5 MG/1
5 TABLET ORAL EVERY EVENING
Qty: 90 TABLET | Refills: 1 | Status: SHIPPED | OUTPATIENT
Start: 2020-08-03 | End: 2021-02-03

## 2020-08-03 RX ORDER — ROSUVASTATIN CALCIUM 5 MG/1
5 TABLET, COATED ORAL NIGHTLY
Qty: 90 TABLET | Refills: 1 | Status: SHIPPED | OUTPATIENT
Start: 2020-08-03 | End: 2021-01-16

## 2020-08-03 NOTE — PROGRESS NOTES
Julien Beaulieu IS A 59year old female [address] [facility] Patient presents with:  Hypertension: refill       History of present illness:     Doing the best she can with pandemic, parents in [facility] 63, 80 & 80. Hasn't seen them.      BP at home not as good as today, typic • Schatzki's ring    • Seborrheic dermatitis, unspecified    • Tachycardia, unspecified    • Thyrotoxicosis without mention of goiter or other cause, without mention of thyrotoxic crisis or storm    • Urinary retention    • Visual impairment     glasses Oral Tab Take 50 mg by mouth. At Bedtime  2   • Calcium Carbonate-Vitamin D (CALCIUM-D) 600-400 MG-UNIT Oral Tab Take 1 tablet by mouth 3 (three) times a week. • Biotin 1000 MCG Oral Tab Take 1,000 mcg by mouth 3 (three) times a week.        • Magnesium file        Minutes per session: Not on file      Stress: Not on file    Relationships      Social connections:        Talks on phone: Not on file        Gets together: Not on file        Attends Hinduism service: Not on file        Active member of club 30 MG Oral Capsule Delayed Release; TAKE ONE CAPSULE BY MOUTH 3 TIMES A WEEK    Essential hypertension with goal blood pressure less than 140/90  -     amLODIPine Besylate 2.5 MG Oral Tab; Take 1 tablet (2.5 mg total) by mouth daily.  Add to 5 mg tablets  -

## 2020-08-04 DIAGNOSIS — K21.9 GASTROESOPHAGEAL REFLUX DISEASE WITHOUT ESOPHAGITIS: ICD-10-CM

## 2020-08-04 RX ORDER — LANSOPRAZOLE 30 MG/1
CAPSULE, DELAYED RELEASE ORAL
Qty: 39 CAPSULE | Refills: 2 | OUTPATIENT
Start: 2020-08-04

## 2020-08-04 NOTE — TELEPHONE ENCOUNTER
LOV    LAST LAB    LAST RX   lansoprazole 30 MG Oral Capsule Delayed Release 45 capsule 1 8/3/2020    Sig:   TAKE ONE CAPSULE BY MOUTH 3 TIMES A WEEK           Next OV   Future Appointments   Date Time Provider Benjamin Wilkerson   9/2/2020 12:00 PM Junior Stanford

## 2020-08-11 ENCOUNTER — APPOINTMENT (OUTPATIENT)
Dept: LAB | Age: 65
End: 2020-08-11
Attending: FAMILY MEDICINE
Payer: MEDICARE

## 2020-08-11 DIAGNOSIS — I10 ESSENTIAL HYPERTENSION WITH GOAL BLOOD PRESSURE LESS THAN 140/90: ICD-10-CM

## 2020-08-11 DIAGNOSIS — E78.00 HYPERCHOLESTEROLEMIA: ICD-10-CM

## 2020-08-11 LAB
ANION GAP SERPL CALC-SCNC: 5 MMOL/L (ref 0–18)
BUN BLD-MCNC: 13 MG/DL (ref 7–18)
BUN/CREAT SERPL: 14.9 (ref 10–20)
CALCIUM BLD-MCNC: 9.4 MG/DL (ref 8.5–10.1)
CHLORIDE SERPL-SCNC: 107 MMOL/L (ref 98–112)
CHOLEST SMN-MCNC: 203 MG/DL (ref ?–200)
CO2 SERPL-SCNC: 27 MMOL/L (ref 21–32)
CREAT BLD-MCNC: 0.87 MG/DL (ref 0.55–1.02)
GLUCOSE BLD-MCNC: 80 MG/DL (ref 70–99)
HDLC SERPL-MCNC: 65 MG/DL (ref 40–59)
LDLC SERPL CALC-MCNC: 118 MG/DL (ref ?–100)
NONHDLC SERPL-MCNC: 138 MG/DL (ref ?–130)
OSMOLALITY SERPL CALC.SUM OF ELEC: 287 MOSM/KG (ref 275–295)
PATIENT FASTING Y/N/NP: YES
PATIENT FASTING Y/N/NP: YES
POTASSIUM SERPL-SCNC: 3.6 MMOL/L (ref 3.5–5.1)
SODIUM SERPL-SCNC: 139 MMOL/L (ref 136–145)
TRIGL SERPL-MCNC: 99 MG/DL (ref 30–149)
VLDLC SERPL CALC-MCNC: 20 MG/DL (ref 0–30)

## 2020-08-11 PROCEDURE — 80061 LIPID PANEL: CPT

## 2020-08-11 PROCEDURE — 80048 BASIC METABOLIC PNL TOTAL CA: CPT

## 2020-08-11 PROCEDURE — 36415 COLL VENOUS BLD VENIPUNCTURE: CPT

## 2020-09-03 ENCOUNTER — TELEPHONE (OUTPATIENT)
Dept: FAMILY MEDICINE CLINIC | Facility: CLINIC | Age: 65
End: 2020-09-03

## 2020-09-03 NOTE — TELEPHONE ENCOUNTER
Mammogram order already written 2/4/2020    Dexa Scan was just done in January    Called patient and advised she is up to date with her Dexa scan. Informed there is a current mammogram order in Epic. Instructed to call Class Registration to schedule.   Tiffanie

## 2020-09-03 NOTE — TELEPHONE ENCOUNTER
Patient stated she is in need of mammogram and Dexa Scan. Last OV was 8/3/20. She would like a call back when orders are placed.

## 2020-10-27 ENCOUNTER — HOSPITAL ENCOUNTER (OUTPATIENT)
Dept: MAMMOGRAPHY | Facility: HOSPITAL | Age: 65
Discharge: HOME | End: 2020-10-27
Attending: FAMILY MEDICINE
Payer: MEDICARE

## 2020-10-27 DIAGNOSIS — Z12.31 ENCOUNTER FOR SCREENING MAMMOGRAM FOR BREAST CANCER: ICD-10-CM

## 2020-10-27 PROCEDURE — 77063 BREAST TOMOSYNTHESIS BI: CPT | Performed by: FAMILY MEDICINE

## 2020-10-27 PROCEDURE — 77067 SCR MAMMO BI INCL CAD: CPT | Performed by: FAMILY MEDICINE

## 2020-11-03 ENCOUNTER — OFFICE VISIT (OUTPATIENT)
Dept: FAMILY MEDICINE CLINIC | Facility: CLINIC | Age: 65
End: 2020-11-03
Payer: MEDICARE

## 2020-11-03 ENCOUNTER — MED REC SCAN ONLY (OUTPATIENT)
Dept: FAMILY MEDICINE CLINIC | Facility: CLINIC | Age: 65
End: 2020-11-03

## 2020-11-03 VITALS
SYSTOLIC BLOOD PRESSURE: 92 MMHG | RESPIRATION RATE: 16 BRPM | TEMPERATURE: 98 F | BODY MASS INDEX: 26.4 KG/M2 | HEART RATE: 88 BPM | OXYGEN SATURATION: 98 % | HEIGHT: 63 IN | WEIGHT: 149 LBS | DIASTOLIC BLOOD PRESSURE: 70 MMHG

## 2020-11-03 DIAGNOSIS — Z00.00 ENCOUNTER FOR MEDICARE ANNUAL WELLNESS EXAM: ICD-10-CM

## 2020-11-03 DIAGNOSIS — Z23 NEED FOR VACCINATION: ICD-10-CM

## 2020-11-03 DIAGNOSIS — E78.00 HYPERCHOLESTEROLEMIA: ICD-10-CM

## 2020-11-03 DIAGNOSIS — E04.2 MULTINODULAR GOITER: Primary | ICD-10-CM

## 2020-11-03 DIAGNOSIS — Z00.00 ENCOUNTER FOR ANNUAL HEALTH EXAMINATION: ICD-10-CM

## 2020-11-03 PROBLEM — K59.00 CONSTIPATION: Status: RESOLVED | Noted: 2017-07-14 | Resolved: 2020-11-03

## 2020-11-03 PROBLEM — K52.9 ACUTE GASTROENTERITIS: Status: RESOLVED | Noted: 2019-04-29 | Resolved: 2020-11-03

## 2020-11-03 PROBLEM — R33.9 RETENTION OF URINE: Status: RESOLVED | Noted: 2017-03-28 | Resolved: 2020-11-03

## 2020-11-03 PROBLEM — R63.4 WEIGHT LOSS: Status: RESOLVED | Noted: 2017-07-14 | Resolved: 2020-11-03

## 2020-11-03 PROCEDURE — G0008 ADMIN INFLUENZA VIRUS VAC: HCPCS | Performed by: FAMILY MEDICINE

## 2020-11-03 PROCEDURE — 90662 IIV NO PRSV INCREASED AG IM: CPT | Performed by: FAMILY MEDICINE

## 2020-11-03 PROCEDURE — G0402 INITIAL PREVENTIVE EXAM: HCPCS | Performed by: FAMILY MEDICINE

## 2020-11-03 PROCEDURE — G0009 ADMIN PNEUMOCOCCAL VACCINE: HCPCS | Performed by: FAMILY MEDICINE

## 2020-11-03 PROCEDURE — 90670 PCV13 VACCINE IM: CPT | Performed by: FAMILY MEDICINE

## 2020-11-03 RX ORDER — POLYETHYLENE GLYCOL 3350 17 G/17G
POWDER, FOR SOLUTION ORAL
COMMUNITY
End: 2020-11-03 | Stop reason: ALTCHOICE

## 2020-11-03 NOTE — PATIENT INSTRUCTIONS
prevnar today, pneumovax in 2 years. Upload or fax or bring in advance directives. Gonzalez Reinoso's SCREENING SCHEDULE   Tests on this list are recommended by your physician but may not be covered, or covered at this frequency, by your insurer.  Pl aortic aneurysm screening (once between ages 73-68) IPPE only No results found for this or any previous visit.  Limited to patients who meet one of the following criteria:   • Men who are 73-68 years old and have smoked more than 100 cigarettes in their lif age 76, and as needed after 76 Mammogram due on 10/27/2021 Please get this Mammogram regularly   Immunizations      Influenza  Covered Annually Orders placed or performed in visit on 11/03/20   • FLU VACC HIGH DOSE PRSV FREE   Orders placed or performed in http://[email or web address] idph.state. [place]/public/books/advin.htm  A link to the [employer]. This site has a lot of good information including definitions of the different types of Advance Directives.  It also has the State forms available on it's webs

## 2020-11-03 NOTE — PROGRESS NOTES
HPI:   Yue Muñiz is a 72year old female who presents for a Medicare Initial Annual Wellness visit (Once after 12 month Medicare anniversary) . Welcome to [facility]   Annual Physical due on 02/04/2021      Sees rheum.      Had reclast, repeat DEXA Hiatal hernia--not much trouble     Osteopenia much better on reclast.      Goiter with nodules, will order repeat u/s since I don't see one in 8 years     Heart palpitations     Migraine with aura --rare     Ulnar nerve abnormality left side occasional 0.569 04/14/2017    CREATSERUM 0.87 08/11/2020    GLU 80 08/11/2020        CBC  (most recent labs)   Lab Results   Component Value Date    WBC 4.8 07/28/2020    HGB 11.5 (L) 07/28/2020    .0 07/28/2020        ALLERGIES:   She is allergic to radiolog daily.    •  Cholecalciferol (VITAMIN D) 1000 UNITS Oral Cap, Take 1,000 Units by mouth daily.          MEDICAL INFORMATION:   She  has a past medical history of Abdominal pain, left lower quadrant, Alpha thalassemia trait, Anxiety state, unspecified, Breas SOCIAL HISTORY:   She  reports that she has never smoked. She has never used smokeless tobacco. She reports that she does not drink alcohol or use drugs.      REVIEW OF SYSTEMS:      Negative except as in HPI    EXAM:   BP 92/70   Pulse 88   Temp 98.3 °F supraclavicular, and axillary nodes normal   Neurologic: Normal    and Breasts:  normal appearance, no masses or tenderness     EKG EKG: Rhythm normal sinus Ventricular rate 78 TX interval 140 QRS duration 82 QT//426 Normal axes.  Interpretation: nor depression (HCC)--in remission     Cognitive complaints with normal neuropsychological exam     JESSICA (generalized anxiety disorder)     Halitosis--treated in early 2019 with metronidazole. Not addressed today.          PLAN SUMMARY:   Diagnoses and all order Value   03/08/2014 108        EKG - w/ Initial Preventative Physical Exam only, or if medically necessary Electrocardiogram date       Colorectal Cancer Screening      Colonoscopy Screen every 10 years Colonoscopy due on 02/07/2023 Update [facility] metal- TD and TDaP Not covered by Medicare Part B No vaccine history found This may be covered with your prescription benefits, but Medicare does not cover unless Medically needed    Zoster  Not covered by Medicare Part B No vaccine history found This may History of gestational diabetes or birth of baby weighing more than 9 pounds     Covered at least every 3 years,         Glucose (mg/dL)   Date Value   08/11/2020 80   07/30/2018 93   03/20/2015 77     GLUCOSE (mg/dL)   Date Value   03/10/2014 [address] 48   Americans over age 72 No flowsheet data found.  OK to schedule if you are in this risk group, make sure you have a referral   Bone Density Screening      Bone density screening   Covered every 2 yrs after age 72    Covered yearly for Long term Moderate/High Risk       No orders found for this or any previous visit.  Medium/high risk factors:   End-stage renal disease   Hemophiliacs who received Factor VIII or IX concentrates   Clients of institutions for the mentally retarded   Persons who live i

## 2020-11-04 ENCOUNTER — PATIENT MESSAGE (OUTPATIENT)
Dept: FAMILY MEDICINE CLINIC | Facility: CLINIC | Age: 65
End: 2020-11-04

## 2020-11-05 NOTE — TELEPHONE ENCOUNTER
From: Kayleigh Gerard  To: Macho Whitmore MD  Sent: 11/4/2020 10:07 AM CST  Subject: Test Results Question    I have a question about EKG preventive exam resulted on 11/3/20, 3:37 PM.    I have not been able to access my EKG report. Please advise.  Mp Walter

## 2020-12-08 NOTE — TELEPHONE ENCOUNTER
LOV 2/4/2020    LAST LAB 3/11/20    LAST RX   Rosuvastatin Calcium 5 MG Oral Tab 90 tablet 1 10/9/2019    Sig:   Take 1 tablet (5 mg total) by mouth nightly.      Route:   Oral       Medication Quantity Refills Start End   Potassium Chloride ER (KLOR-CON M1
no

## 2021-01-05 ENCOUNTER — LAB ENCOUNTER (OUTPATIENT)
Dept: LAB | Age: 66
End: 2021-01-05
Attending: FAMILY MEDICINE
Payer: MEDICARE

## 2021-01-05 DIAGNOSIS — E04.2 MULTINODULAR GOITER: ICD-10-CM

## 2021-01-05 DIAGNOSIS — M81.0 SENILE OSTEOPOROSIS: ICD-10-CM

## 2021-01-05 DIAGNOSIS — E78.00 HYPERCHOLESTEROLEMIA: ICD-10-CM

## 2021-01-05 DIAGNOSIS — M05.79 RHEUMATOID ARTHRITIS INVOLVING MULTIPLE SITES WITH POSITIVE RHEUMATOID FACTOR (HCC): ICD-10-CM

## 2021-01-05 DIAGNOSIS — Z79.899 ENCOUNTER FOR LONG-TERM (CURRENT) USE OF HIGH-RISK MEDICATION: ICD-10-CM

## 2021-01-05 LAB
ALBUMIN SERPL-MCNC: 4 G/DL (ref 3.4–5)
ALP LIVER SERPL-CCNC: 66 U/L
ALT SERPL-CCNC: 22 U/L
AST SERPL-CCNC: 12 U/L (ref 15–37)
BASOPHILS # BLD AUTO: 0.04 X10(3) UL (ref 0–0.2)
BASOPHILS NFR BLD AUTO: 0.8 %
BILIRUB DIRECT SERPL-MCNC: <0.1 MG/DL (ref 0–0.2)
BILIRUB SERPL-MCNC: 0.2 MG/DL (ref 0.1–2)
BUN BLD-MCNC: 12 MG/DL (ref 7–18)
CHOLEST SMN-MCNC: 183 MG/DL (ref ?–200)
CREAT BLD-MCNC: 0.99 MG/DL
CRP SERPL-MCNC: <0.29 MG/DL (ref ?–0.3)
DEPRECATED RDW RBC AUTO: 37 FL (ref 35.1–46.3)
EOSINOPHIL # BLD AUTO: 0.02 X10(3) UL (ref 0–0.7)
EOSINOPHIL NFR BLD AUTO: 0.4 %
ERYTHROCYTE [DISTWIDTH] IN BLOOD BY AUTOMATED COUNT: 13.8 % (ref 11–15)
HCT VFR BLD AUTO: 38.8 %
HDLC SERPL-MCNC: 63 MG/DL (ref 40–59)
HGB BLD-MCNC: 11.7 G/DL
IMM GRANULOCYTES # BLD AUTO: 0.02 X10(3) UL (ref 0–1)
IMM GRANULOCYTES NFR BLD: 0.4 %
LDLC SERPL CALC-MCNC: 96 MG/DL (ref ?–100)
LYMPHOCYTES # BLD AUTO: 2.8 X10(3) UL (ref 1–4)
LYMPHOCYTES NFR BLD AUTO: 52.8 %
M PROTEIN MFR SERPL ELPH: 7.4 G/DL (ref 6.4–8.2)
MCH RBC QN AUTO: 22.9 PG (ref 26–34)
MCHC RBC AUTO-ENTMCNC: 30.2 G/DL (ref 31–37)
MCV RBC AUTO: 75.8 FL
MONOCYTES # BLD AUTO: 0.6 X10(3) UL (ref 0.1–1)
MONOCYTES NFR BLD AUTO: 11.3 %
NEUTROPHILS # BLD AUTO: 1.82 X10 (3) UL (ref 1.5–7.7)
NEUTROPHILS # BLD AUTO: 1.82 X10(3) UL (ref 1.5–7.7)
NEUTROPHILS NFR BLD AUTO: 34.3 %
NONHDLC SERPL-MCNC: 120 MG/DL (ref ?–130)
PATIENT FASTING Y/N/NP: YES
PLATELET # BLD AUTO: 250 10(3)UL (ref 150–450)
RBC # BLD AUTO: 5.12 X10(6)UL
SED RATE-ML: 9 MM/HR
TRIGL SERPL-MCNC: 118 MG/DL (ref 30–149)
TSI SER-ACNC: 2.77 MIU/ML (ref 0.36–3.74)
VLDLC SERPL CALC-MCNC: 24 MG/DL (ref 0–30)
WBC # BLD AUTO: 5.3 X10(3) UL (ref 4–11)

## 2021-01-05 PROCEDURE — 80076 HEPATIC FUNCTION PANEL: CPT

## 2021-01-05 PROCEDURE — 82565 ASSAY OF CREATININE: CPT

## 2021-01-05 PROCEDURE — 85652 RBC SED RATE AUTOMATED: CPT

## 2021-01-05 PROCEDURE — 84520 ASSAY OF UREA NITROGEN: CPT

## 2021-01-05 PROCEDURE — 86140 C-REACTIVE PROTEIN: CPT

## 2021-01-05 PROCEDURE — 36415 COLL VENOUS BLD VENIPUNCTURE: CPT

## 2021-01-05 PROCEDURE — 85025 COMPLETE CBC W/AUTO DIFF WBC: CPT

## 2021-01-05 PROCEDURE — 84443 ASSAY THYROID STIM HORMONE: CPT

## 2021-01-05 PROCEDURE — 80061 LIPID PANEL: CPT

## 2021-01-10 DIAGNOSIS — K21.9 GASTROESOPHAGEAL REFLUX DISEASE WITHOUT ESOPHAGITIS: ICD-10-CM

## 2021-01-11 RX ORDER — LANSOPRAZOLE 30 MG/1
CAPSULE, DELAYED RELEASE ORAL
Qty: 36 CAPSULE | Refills: 2 | Status: SHIPPED | OUTPATIENT
Start: 2021-01-11 | End: 2021-02-03

## 2021-01-11 NOTE — TELEPHONE ENCOUNTER
Name from pharmacy: LANSOPRAZOLE DR 30 MG CAPSULE          Will file in chart as: LANSOPRAZOLE 30 MG Oral Capsule Delayed Release    Sig: TAKE ONE CAPSULE BY MOUTH 3 TIMES A WEEK    Disp:  36 capsule    Refills:  2    Start: 1/10/2021    Class: Normal    N

## 2021-01-14 ENCOUNTER — HOSPITAL ENCOUNTER (OUTPATIENT)
Dept: MRI IMAGING | Facility: HOSPITAL | Age: 66
Discharge: HOME | End: 2021-01-14
Attending: INTERNAL MEDICINE
Payer: MEDICARE

## 2021-01-14 DIAGNOSIS — M53.3 SACROILIAC PAIN: ICD-10-CM

## 2021-01-14 DIAGNOSIS — M05.79 RHEUMATOID ARTHRITIS INVOLVING MULTIPLE SITES WITH POSITIVE RHEUMATOID FACTOR (HCC): ICD-10-CM

## 2021-01-14 PROCEDURE — 72197 MRI PELVIS W/O & W/DYE: CPT | Performed by: INTERNAL MEDICINE

## 2021-01-14 PROCEDURE — A9575 INJ GADOTERATE MEGLUMI 0.1ML: HCPCS | Performed by: INTERNAL MEDICINE

## 2021-01-16 DIAGNOSIS — E78.00 HYPERCHOLESTEROLEMIA: ICD-10-CM

## 2021-01-16 RX ORDER — ROSUVASTATIN CALCIUM 5 MG/1
TABLET, COATED ORAL
Qty: 90 TABLET | Refills: 0 | Status: SHIPPED | OUTPATIENT
Start: 2021-01-16 | End: 2021-02-03

## 2021-01-16 NOTE — TELEPHONE ENCOUNTER
LOV 11 3/2020    LAST LAB 1/5/21     LAST RX   Rosuvastatin Calcium 5 MG Oral Tab 90 tablet 1 8/3/2020    Sig:   Take 1 tablet (5 mg total) by mouth nightly.            Next OV   Future Appointments   Date Time Provider Benjamin Wilkerson   1/20/2021  2:30 P

## 2021-01-20 ENCOUNTER — HOSPITAL ENCOUNTER (OUTPATIENT)
Dept: ULTRASOUND IMAGING | Facility: HOSPITAL | Age: 66
Discharge: HOME | End: 2021-01-20
Attending: FAMILY MEDICINE
Payer: MEDICARE

## 2021-01-20 DIAGNOSIS — E04.2 MULTINODULAR GOITER: ICD-10-CM

## 2021-01-20 PROCEDURE — 76536 US EXAM OF HEAD AND NECK: CPT | Performed by: FAMILY MEDICINE

## 2021-02-03 ENCOUNTER — OFFICE VISIT (OUTPATIENT)
Dept: FAMILY MEDICINE CLINIC | Facility: CLINIC | Age: 66
End: 2021-02-03
Payer: MEDICARE

## 2021-02-03 VITALS
WEIGHT: 149 LBS | TEMPERATURE: 98 F | RESPIRATION RATE: 16 BRPM | SYSTOLIC BLOOD PRESSURE: 100 MMHG | DIASTOLIC BLOOD PRESSURE: 68 MMHG | HEART RATE: 86 BPM | BODY MASS INDEX: 26.4 KG/M2 | OXYGEN SATURATION: 98 % | HEIGHT: 63 IN

## 2021-02-03 DIAGNOSIS — E04.2 MULTINODULAR GOITER: ICD-10-CM

## 2021-02-03 DIAGNOSIS — I10 ESSENTIAL HYPERTENSION, BENIGN: Primary | ICD-10-CM

## 2021-02-03 DIAGNOSIS — D56.3 ALPHA THALASSEMIA TRAIT: ICD-10-CM

## 2021-02-03 DIAGNOSIS — K21.9 GASTROESOPHAGEAL REFLUX DISEASE WITHOUT ESOPHAGITIS: ICD-10-CM

## 2021-02-03 DIAGNOSIS — I10 ESSENTIAL HYPERTENSION WITH GOAL BLOOD PRESSURE LESS THAN 140/90: ICD-10-CM

## 2021-02-03 DIAGNOSIS — E78.00 HYPERCHOLESTEROLEMIA: ICD-10-CM

## 2021-02-03 DIAGNOSIS — E87.6 HYPOKALEMIA: ICD-10-CM

## 2021-02-03 PROCEDURE — 99214 OFFICE O/P EST MOD 30 MIN: CPT | Performed by: FAMILY MEDICINE

## 2021-02-03 RX ORDER — LANSOPRAZOLE 30 MG/1
30 CAPSULE, DELAYED RELEASE ORAL
Qty: 36 CAPSULE | Refills: 2 | Status: SHIPPED | OUTPATIENT
Start: 2021-02-03

## 2021-02-03 RX ORDER — AMLODIPINE BESYLATE 2.5 MG/1
2.5 TABLET ORAL DAILY
Qty: 90 TABLET | Refills: 1 | Status: SHIPPED | OUTPATIENT
Start: 2021-02-03 | End: 2021-08-27

## 2021-02-03 RX ORDER — ROSUVASTATIN CALCIUM 5 MG/1
5 TABLET, COATED ORAL NIGHTLY
Qty: 90 TABLET | Refills: 1 | Status: SHIPPED | OUTPATIENT
Start: 2021-02-03 | End: 2021-05-06

## 2021-02-03 RX ORDER — TRIAMTERENE AND HYDROCHLOROTHIAZIDE 37.5; 25 MG/1; MG/1
1 TABLET ORAL DAILY
Qty: 90 TABLET | Refills: 1 | Status: SHIPPED | OUTPATIENT
Start: 2021-02-03 | End: 2021-08-27

## 2021-02-03 RX ORDER — POTASSIUM CHLORIDE 750 MG/1
TABLET, EXTENDED RELEASE ORAL
Qty: 270 TABLET | Refills: 1 | Status: SHIPPED | OUTPATIENT
Start: 2021-02-03 | End: 2021-08-05

## 2021-02-03 RX ORDER — AMLODIPINE BESYLATE 5 MG/1
5 TABLET ORAL EVERY EVENING
Qty: 90 TABLET | Refills: 1 | Status: SHIPPED | OUTPATIENT
Start: 2021-02-03 | End: 2021-09-29

## 2021-02-03 NOTE — PROGRESS NOTES
Julien Beaulieu IS A 72year old female 149 [facility] Patient presents with:  Hypertension: f/u. History of present illness:     Hasn't checked BP at home recently. Usually 120s-130s/80s to 90s in past.     Doing ok with pandemic.    Monthly therapist, [facility] ([facility]    • Schatzki's ring    • Seborrheic dermatitis, unspecified    • Tachycardia, unspecified    • Thyrotoxicosis without mention of goiter or other cause, without mention of thyrotoxic crisis or storm    • Urinary retention    • Visual impair one and half tab At Bedtime   2   • Calcium Carbonate-Vitamin D (CALCIUM-D) 600-400 MG-UNIT Oral Tab Take 1 tablet by mouth 3 (three) times a week. • Biotin 1000 MCG Oral Tab Take 1,000 mcg by mouth 3 (three) times a week.        • Magnesium 400 MG Oral on file        Gets together: Not on file        Attends Spiritism service: Not on file        Active member of club or organization: Not on file        Attends meetings of clubs or organizations: Not on file        Relationship status: Not on file      In ours next time. Multinodular goiter with recent thyroid ultrasound showing 3 new 8 mm or less nodules. Recheck in a year. Alpha thalassemia trait--no iron needed. Hypercholesterolemia--continue current rx.  Check lipids in 6 months (wait until c

## 2021-02-03 NOTE — PATIENT INSTRUCTIONS
Continue current dose of meds. Might consider decreasing triamterene hydrochlorothiazide to 1/2 tablet if BP is acceptable (Under 130/80) to see if we could decrease K replacement.     Recheck in about 3 months (I'll be on leave May 11-early July, I'd like

## 2021-02-11 ENCOUNTER — PATIENT MESSAGE (OUTPATIENT)
Dept: FAMILY MEDICINE CLINIC | Facility: CLINIC | Age: 66
End: 2021-02-11

## 2021-02-12 NOTE — TELEPHONE ENCOUNTER
From: [facility]  To: Shane Hernandez MD  Sent: 2/11/2021 4:00 PM CST  Subject: Non-Urgent Medical Question    Hello:  Are you placing orders for your patients to receive CoV-2 vaccine? If yes, would you be so kind as to place an order for me?  I'm

## 2021-03-05 DIAGNOSIS — Z23 NEED FOR VACCINATION: ICD-10-CM

## 2021-04-13 ENCOUNTER — LAB ENCOUNTER (OUTPATIENT)
Dept: LAB | Age: 66
End: 2021-04-13
Attending: INTERNAL MEDICINE
Payer: MEDICARE

## 2021-04-13 DIAGNOSIS — M05.79 RHEUMATOID ARTHRITIS INVOLVING MULTIPLE SITES WITH POSITIVE RHEUMATOID FACTOR (HCC): ICD-10-CM

## 2021-04-13 DIAGNOSIS — Z79.899 ENCOUNTER FOR LONG-TERM (CURRENT) USE OF HIGH-RISK MEDICATION: ICD-10-CM

## 2021-04-13 DIAGNOSIS — M81.0 SENILE OSTEOPOROSIS: ICD-10-CM

## 2021-04-13 PROCEDURE — 85652 RBC SED RATE AUTOMATED: CPT

## 2021-04-13 PROCEDURE — 85025 COMPLETE CBC W/AUTO DIFF WBC: CPT

## 2021-04-13 PROCEDURE — 82565 ASSAY OF CREATININE: CPT

## 2021-04-13 PROCEDURE — 36415 COLL VENOUS BLD VENIPUNCTURE: CPT

## 2021-04-13 PROCEDURE — 84520 ASSAY OF UREA NITROGEN: CPT

## 2021-04-13 PROCEDURE — 86140 C-REACTIVE PROTEIN: CPT

## 2021-04-13 PROCEDURE — 80076 HEPATIC FUNCTION PANEL: CPT

## 2021-04-15 NOTE — PROGRESS NOTES
Released to [email or web address]. Dr. Finn Payment,     Please see attached results:    No lab evidence of rheumatologic drug toxicities. OK to continue. Overall your blood count, kidney and liver function are stable.      Your inflammatory markers are stable which ind

## 2021-05-04 ENCOUNTER — PATIENT MESSAGE (OUTPATIENT)
Dept: FAMILY MEDICINE CLINIC | Facility: CLINIC | Age: 66
End: 2021-05-04

## 2021-05-04 DIAGNOSIS — M81.0 AGE-RELATED OSTEOPOROSIS WITHOUT CURRENT PATHOLOGICAL FRACTURE: Primary | ICD-10-CM

## 2021-05-05 ENCOUNTER — TELEPHONE (OUTPATIENT)
Dept: FAMILY MEDICINE CLINIC | Facility: CLINIC | Age: 66
End: 2021-05-05

## 2021-05-05 NOTE — TELEPHONE ENCOUNTER
Called patient and advised Vitamin D level ordered. Informed too early for another Lipid panel, it was just done in January.

## 2021-05-05 NOTE — TELEPHONE ENCOUNTER
From: Tasia Pagan  To: Maria D Brown MD  Sent: 5/4/2021 4:11 PM CDT  Subject: Non-Urgent Medical Question    Hello:  Would you send over an order for a Vitamin D level?  I will have my blood drawn in the morning, and will come for a visit with you

## 2021-05-06 ENCOUNTER — LAB ENCOUNTER (OUTPATIENT)
Dept: LAB | Age: 66
End: 2021-05-06
Attending: FAMILY MEDICINE
Payer: MEDICARE

## 2021-05-06 ENCOUNTER — OFFICE VISIT (OUTPATIENT)
Dept: FAMILY MEDICINE CLINIC | Facility: CLINIC | Age: 66
End: 2021-05-06
Payer: MEDICARE

## 2021-05-06 VITALS
HEART RATE: 104 BPM | TEMPERATURE: 98 F | RESPIRATION RATE: 16 BRPM | HEIGHT: 63.5 IN | DIASTOLIC BLOOD PRESSURE: 70 MMHG | WEIGHT: 143 LBS | BODY MASS INDEX: 25.02 KG/M2 | SYSTOLIC BLOOD PRESSURE: 102 MMHG | OXYGEN SATURATION: 99 %

## 2021-05-06 DIAGNOSIS — E78.00 HYPERCHOLESTEROLEMIA: ICD-10-CM

## 2021-05-06 DIAGNOSIS — B35.1 ONYCHOMYCOSIS: ICD-10-CM

## 2021-05-06 DIAGNOSIS — I10 ESSENTIAL HYPERTENSION WITH GOAL BLOOD PRESSURE LESS THAN 140/90: ICD-10-CM

## 2021-05-06 DIAGNOSIS — M81.0 AGE-RELATED OSTEOPOROSIS WITHOUT CURRENT PATHOLOGICAL FRACTURE: ICD-10-CM

## 2021-05-06 DIAGNOSIS — I10 ESSENTIAL HYPERTENSION, BENIGN: ICD-10-CM

## 2021-05-06 DIAGNOSIS — E78.00 HYPERCHOLESTEROLEMIA: Primary | ICD-10-CM

## 2021-05-06 DIAGNOSIS — E87.6 HYPOKALEMIA: ICD-10-CM

## 2021-05-06 PROCEDURE — 80061 LIPID PANEL: CPT

## 2021-05-06 PROCEDURE — 80048 BASIC METABOLIC PNL TOTAL CA: CPT

## 2021-05-06 PROCEDURE — 36415 COLL VENOUS BLD VENIPUNCTURE: CPT

## 2021-05-06 PROCEDURE — 82306 VITAMIN D 25 HYDROXY: CPT

## 2021-05-06 PROCEDURE — 99214 OFFICE O/P EST MOD 30 MIN: CPT | Performed by: FAMILY MEDICINE

## 2021-05-06 RX ORDER — ROSUVASTATIN CALCIUM 10 MG/1
10 TABLET, COATED ORAL NIGHTLY
Qty: 30 TABLET | Refills: 3 | Status: SHIPPED | OUTPATIENT
Start: 2021-05-06 | End: 2021-08-30

## 2021-05-06 RX ORDER — CLEMASTINE FUMARATE 2.68 MG
TABLET ORAL
COMMUNITY
Start: 2021-03-31

## 2021-05-06 NOTE — PROGRESS NOTES
/70   Pulse 104   Temp 98.2 °F (36.8 °C) (Temporal)   Resp 16   Ht 5' 3.5\" (1.613 m)   Wt 143 lb (64.9 kg)   SpO2 99%   BMI 24.93 kg/m²               Patient presents with:  Hypertension: f/u.         HPI;    Carlitos Lees is a 72year old female 3 (three) times a week. 36 capsule 2   • amLODIPine Besylate 5 MG Oral Tab Take 1 tablet (5 mg total) by mouth every evening. 90 tablet 1   • amLODIPine Besylate 2.5 MG Oral Tab Take 1 tablet (2.5 mg total) by mouth daily.  Add to 5 mg tablets 90 tablet 1 syndrome    • Migraine, unspecified, without mention of intractable migraine without mention of status migrainosus    • Osteoarthrosis, unspecified whether generalized or localized, other specified sites    • Osteopenia     lumbar spine   • Osteoporosis, u LIPID PANEL; Future  -     Rosuvastatin Calcium 10 MG Oral Tab; Take 1 tablet (10 mg total) by mouth nightly.     Onychomycosis  -Referral given to see dermatology      DERM - INTERNAL    Essential hypertension with goal blood pressure less than 140/90  Jennifer Gilmore

## 2021-05-07 NOTE — PROGRESS NOTES
Notify the patient that her LDL is 104I am sending a prescription of Crestor 10 mg to the pharmacyRecheck lipid profile in 3 months

## 2021-07-20 ENCOUNTER — PATIENT MESSAGE (OUTPATIENT)
Dept: FAMILY MEDICINE CLINIC | Facility: CLINIC | Age: 66
End: 2021-07-20

## 2021-07-20 DIAGNOSIS — E78.00 HYPERCHOLESTEROLEMIA: Primary | ICD-10-CM

## 2021-07-20 NOTE — TELEPHONE ENCOUNTER
Per Dr. Razia Montero notes 5/6/21: Hypercholesterolemia  Lipid panel was added to the blood test that was done this morning  It showed that her LDL has increased above 100  A prescription for Crestor is faxed to the pharmacy on file  Follow-up lipid panel in 3

## 2021-07-20 NOTE — TELEPHONE ENCOUNTER
From: Tasia Pagan  To: Taylor Pleitez MD  Sent: 7/20/2021 9:09 AM CDT  Subject: Non-Urgent Bipin Alston Laughter:  You prescribed an increase in my Rosuvastatin dose to 10mg, which I started mid May.  I'd like to request a lab order to rech

## 2021-07-31 ENCOUNTER — PATIENT MESSAGE (OUTPATIENT)
Dept: FAMILY MEDICINE CLINIC | Facility: CLINIC | Age: 66
End: 2021-07-31

## 2021-08-02 NOTE — TELEPHONE ENCOUNTER
From: Emmy Mccracken  To: Sergio Jasso MD  Sent: 7/31/2021 1:28 PM CDT  Subject: Non-Urgent Wauneta Haagensen Dr. Jerris Cranker:  I have concerns about my  and I being exposed to the Delta variant of SARS-CoV-2. Both of us are on Prednisone.  I am

## 2021-08-05 DIAGNOSIS — I10 ESSENTIAL HYPERTENSION WITH GOAL BLOOD PRESSURE LESS THAN 140/90: ICD-10-CM

## 2021-08-05 RX ORDER — POTASSIUM CHLORIDE 750 MG/1
TABLET, EXTENDED RELEASE ORAL
Qty: 270 TABLET | Refills: 1 | Status: SHIPPED | OUTPATIENT
Start: 2021-08-05 | End: 2022-01-26

## 2021-08-05 NOTE — TELEPHONE ENCOUNTER
LOV 5/6/21    LAST LAB 5/6/21    LAST RX   Potassium Chloride ER (KLOR-CON M10) 10 MEQ Oral Tab  tablet 1 2/3/2021     Sig: 10 meQ in AM, 20 meQ in PM          Next OV   Future Appointments   Date Time Provider Benjamin Wilkerson   8/9/2021 11:45 AM K

## 2021-08-09 ENCOUNTER — LAB ENCOUNTER (OUTPATIENT)
Dept: LAB | Age: 66
End: 2021-08-09
Attending: FAMILY MEDICINE
Payer: MEDICARE

## 2021-08-09 ENCOUNTER — OFFICE VISIT (OUTPATIENT)
Dept: FAMILY MEDICINE CLINIC | Facility: CLINIC | Age: 66
End: 2021-08-09
Payer: MEDICARE

## 2021-08-09 VITALS
RESPIRATION RATE: 16 BRPM | SYSTOLIC BLOOD PRESSURE: 106 MMHG | WEIGHT: 139 LBS | TEMPERATURE: 97 F | HEIGHT: 63.5 IN | DIASTOLIC BLOOD PRESSURE: 64 MMHG | BODY MASS INDEX: 24.32 KG/M2 | OXYGEN SATURATION: 97 % | HEART RATE: 76 BPM

## 2021-08-09 DIAGNOSIS — Z79.899 ENCOUNTER FOR LONG-TERM (CURRENT) USE OF HIGH-RISK MEDICATION: ICD-10-CM

## 2021-08-09 DIAGNOSIS — E78.00 HYPERCHOLESTEROLEMIA: ICD-10-CM

## 2021-08-09 DIAGNOSIS — D84.9 IMMUNOSUPPRESSED STATUS (HCC): ICD-10-CM

## 2021-08-09 DIAGNOSIS — M05.79 RHEUMATOID ARTHRITIS INVOLVING MULTIPLE SITES WITH POSITIVE RHEUMATOID FACTOR (HCC): ICD-10-CM

## 2021-08-09 DIAGNOSIS — M81.0 SENILE OSTEOPOROSIS: ICD-10-CM

## 2021-08-09 DIAGNOSIS — D84.9 IMMUNOSUPPRESSED STATUS (HCC): Primary | ICD-10-CM

## 2021-08-09 LAB
ALBUMIN SERPL-MCNC: 4 G/DL (ref 3.4–5)
ALBUMIN/GLOB SERPL: 1.1 {RATIO} (ref 1–2)
ALP LIVER SERPL-CCNC: 57 U/L
ALT SERPL-CCNC: 25 U/L
ANION GAP SERPL CALC-SCNC: 3 MMOL/L (ref 0–18)
AST SERPL-CCNC: 15 U/L (ref 15–37)
BASOPHILS # BLD AUTO: 0.06 X10(3) UL (ref 0–0.2)
BASOPHILS NFR BLD AUTO: 1.4 %
BILIRUB DIRECT SERPL-MCNC: <0.1 MG/DL (ref 0–0.2)
BILIRUB SERPL-MCNC: 0.2 MG/DL (ref 0.1–2)
BUN BLD-MCNC: 12 MG/DL (ref 7–18)
CALCIUM BLD-MCNC: 9.4 MG/DL (ref 8.5–10.1)
CHLORIDE SERPL-SCNC: 108 MMOL/L (ref 98–112)
CHOLEST SMN-MCNC: 169 MG/DL (ref ?–200)
CO2 SERPL-SCNC: 29 MMOL/L (ref 21–32)
CREAT BLD-MCNC: 0.74 MG/DL
CRP SERPL-MCNC: <0.29 MG/DL (ref ?–0.3)
EOSINOPHIL # BLD AUTO: 0.06 X10(3) UL (ref 0–0.7)
EOSINOPHIL NFR BLD AUTO: 1.4 %
ERYTHROCYTE [DISTWIDTH] IN BLOOD BY AUTOMATED COUNT: 13.5 %
GLOBULIN PLAS-MCNC: 3.5 G/DL (ref 2.8–4.4)
GLUCOSE BLD-MCNC: 74 MG/DL (ref 70–99)
HCT VFR BLD AUTO: 39.3 %
HDLC SERPL-MCNC: 58 MG/DL (ref 40–59)
HGB BLD-MCNC: 12.2 G/DL
IMM GRANULOCYTES # BLD AUTO: 0.01 X10(3) UL (ref 0–1)
IMM GRANULOCYTES NFR BLD: 0.2 %
LDLC SERPL CALC-MCNC: 97 MG/DL (ref ?–100)
LYMPHOCYTES # BLD AUTO: 2.04 X10(3) UL (ref 1–4)
LYMPHOCYTES NFR BLD AUTO: 47.8 %
M PROTEIN MFR SERPL ELPH: 7.5 G/DL (ref 6.4–8.2)
MCH RBC QN AUTO: 23.5 PG (ref 26–34)
MCHC RBC AUTO-ENTMCNC: 31 G/DL (ref 31–37)
MCV RBC AUTO: 75.6 FL
MONOCYTES # BLD AUTO: 0.59 X10(3) UL (ref 0.1–1)
MONOCYTES NFR BLD AUTO: 13.8 %
NEUTROPHILS # BLD AUTO: 1.51 X10 (3) UL (ref 1.5–7.7)
NEUTROPHILS # BLD AUTO: 1.51 X10(3) UL (ref 1.5–7.7)
NEUTROPHILS NFR BLD AUTO: 35.4 %
NONHDLC SERPL-MCNC: 111 MG/DL (ref ?–130)
OSMOLALITY SERPL CALC.SUM OF ELEC: 288 MOSM/KG (ref 275–295)
PATIENT FASTING Y/N/NP: YES
PATIENT FASTING Y/N/NP: YES
PLATELET # BLD AUTO: 254 10(3)UL (ref 150–450)
POTASSIUM SERPL-SCNC: 3.7 MMOL/L (ref 3.5–5.1)
RBC # BLD AUTO: 5.2 X10(6)UL
SARS-COV-2 IGG+IGM SERPL QL IA: REACTIVE
SED RATE-ML: 14 MM/HR
SODIUM SERPL-SCNC: 140 MMOL/L (ref 136–145)
TRIGL SERPL-MCNC: 71 MG/DL (ref 30–149)
VLDLC SERPL CALC-MCNC: 12 MG/DL (ref 0–30)
WBC # BLD AUTO: 4.3 X10(3) UL (ref 4–11)

## 2021-08-09 PROCEDURE — 36415 COLL VENOUS BLD VENIPUNCTURE: CPT

## 2021-08-09 PROCEDURE — 85025 COMPLETE CBC W/AUTO DIFF WBC: CPT

## 2021-08-09 PROCEDURE — 85652 RBC SED RATE AUTOMATED: CPT

## 2021-08-09 PROCEDURE — 99214 OFFICE O/P EST MOD 30 MIN: CPT | Performed by: FAMILY MEDICINE

## 2021-08-09 PROCEDURE — 80061 LIPID PANEL: CPT

## 2021-08-09 PROCEDURE — 82248 BILIRUBIN DIRECT: CPT

## 2021-08-09 PROCEDURE — 86769 SARS-COV-2 COVID-19 ANTIBODY: CPT

## 2021-08-09 PROCEDURE — 80053 COMPREHEN METABOLIC PANEL: CPT

## 2021-08-09 PROCEDURE — 86140 C-REACTIVE PROTEIN: CPT

## 2021-08-09 NOTE — PROGRESS NOTES
/64   Pulse 76   Temp 97.4 °F (36.3 °C) (Temporal)   Resp 16   Ht 5' 3.5\" (1.613 m)   Wt 139 lb (63 kg)   SpO2 97%   BMI 24.24 kg/m²               Patient presents with:  Medication Follow-Up: refills       HPI;    Neshajasmin Bella is a 77year old Besylate 5 MG Oral Tab Take 1 tablet (5 mg total) by mouth every evening. 90 tablet 1   • amLODIPine Besylate 2.5 MG Oral Tab Take 1 tablet (2.5 mg total) by mouth daily.  Add to 5 mg tablets 90 tablet 1   • Triamterene-HCTZ 37.5-25 MG Oral Tab Take 1 table Other abnormal blood chemistry    • Other constipation    • Pain in joint, pelvic region and thigh    • Palpitations    • PONV (postoperative nausea and vomiting)    • Problems with swallowing    • Rheumatoid arthritis (HCC)    • Schatzki's ring    • Grant discussions regarding CDC recommendations and the present treatment of Covid infection    The patient indicates understanding of these issues and agrees to the plan.   Imaging & Consults:  INFECTIOUS DISEASE - INTERNAL  Meds & Refills for this Visit:  Anahy

## 2021-08-10 NOTE — PROGRESS NOTES
Covid antibody is positive  I left a message on the patient's voicemail  It is a qualitative test, unfortunately did not give me the quantitative numberA quantitative test has been orderedPatient can get it done at any time

## 2021-08-10 NOTE — PROGRESS NOTES
Will discuss results with pt at follow up visit.   Future Appointments  8/18/2021  8:20 AM    Gutierrez Manuel MD               [facility]

## 2021-08-18 ENCOUNTER — LAB ENCOUNTER (OUTPATIENT)
Dept: LAB | Age: 66
End: 2021-08-18
Attending: FAMILY MEDICINE
Payer: MEDICARE

## 2021-08-18 DIAGNOSIS — D84.9 IMMUNOSUPPRESSION-RELATED INFECTIOUS DISEASE (HCC): Primary | ICD-10-CM

## 2021-08-18 DIAGNOSIS — B99.8 IMMUNOSUPPRESSION-RELATED INFECTIOUS DISEASE (HCC): Primary | ICD-10-CM

## 2021-08-18 PROCEDURE — 86769 SARS-COV-2 COVID-19 ANTIBODY: CPT

## 2021-08-24 ENCOUNTER — PATIENT MESSAGE (OUTPATIENT)
Dept: FAMILY MEDICINE CLINIC | Facility: CLINIC | Age: 66
End: 2021-08-24

## 2021-08-24 NOTE — TELEPHONE ENCOUNTER
In my opinion these questions are for experts  I already gave her a referral to see Dr. Clover Soriano  Patient should address these questions with infectious disease

## 2021-08-24 NOTE — TELEPHONE ENCOUNTER
From: Christina López  To: Gill Olson MD  Sent: 8/24/2021 3:26 PM CDT  Subject: Non-Urgent Medical Question    Thank you for getting back to me so quickly. Yes, I've made an appointment with Dr. Valente Cummins for 9/15/21.  It was my thought that Dr. Joseph Kerns might

## 2021-08-25 ENCOUNTER — PATIENT MESSAGE (OUTPATIENT)
Dept: FAMILY MEDICINE CLINIC | Facility: CLINIC | Age: 66
End: 2021-08-25

## 2021-08-26 NOTE — TELEPHONE ENCOUNTER
From: Griselda Estes  To: Josse Grajeda MD  Sent: 8/25/2021 2:13 PM CDT  Subject: Non-Urgent Medical Question    Hello:  I didn't expect to have to contact you again about this issue.  However I received a call from Dr. Manuel Mcgill office staff saying that h

## 2021-08-26 NOTE — TELEPHONE ENCOUNTER
Called patient and discussed at length that Dr. Susi Odonnell has no more information regarding her questions about further interpretation of results of her covid antibody testing.  Suggested she contact the lab where the test was done to see if they can give her fu

## 2021-08-27 DIAGNOSIS — I10 ESSENTIAL HYPERTENSION WITH GOAL BLOOD PRESSURE LESS THAN 140/90: ICD-10-CM

## 2021-08-27 RX ORDER — AMLODIPINE BESYLATE 2.5 MG/1
2.5 TABLET ORAL DAILY
Qty: 90 TABLET | Refills: 1 | Status: SHIPPED | OUTPATIENT
Start: 2021-08-27

## 2021-08-27 RX ORDER — TRIAMTERENE AND HYDROCHLOROTHIAZIDE 37.5; 25 MG/1; MG/1
TABLET ORAL
Qty: 90 TABLET | Refills: 1 | Status: SHIPPED | OUTPATIENT
Start: 2021-08-27

## 2021-08-27 NOTE — TELEPHONE ENCOUNTER
LOV 5/6/2021    LAST LAB 8/9/2021    LAST RX   Triamterene-HCTZ 37.5-25 MG Oral Tab 90 tablet 1 2/3/2021     amLODIPine Besylate 2.5 MG Oral Tab 90 tablet 1 2/3/2021           Next OV   Future Appointments   Date Time Provider Benjamin Wilkerson   11/16/202

## 2021-08-28 DIAGNOSIS — E78.00 HYPERCHOLESTEROLEMIA: ICD-10-CM

## 2021-08-30 RX ORDER — ROSUVASTATIN CALCIUM 10 MG/1
TABLET, COATED ORAL
Qty: 90 TABLET | Refills: 1 | Status: SHIPPED | OUTPATIENT
Start: 2021-08-30

## 2021-08-30 NOTE — TELEPHONE ENCOUNTER
LOV 5/6/2021      LAST LAB  8/9/2021    LAST RX   Rosuvastatin Calcium 10 MG Oral Tab 30 tablet 3 5/6/2021         Next OV   Future Appointments   Date Time Provider Benjamin Wilkerson   11/16/2021 10:00 AM Iain Camilo MD [facility]   2/9/2022 11:30 AM

## 2021-09-29 DIAGNOSIS — I10 ESSENTIAL HYPERTENSION WITH GOAL BLOOD PRESSURE LESS THAN 140/90: ICD-10-CM

## 2021-09-29 RX ORDER — AMLODIPINE BESYLATE 5 MG/1
5 TABLET ORAL EVERY EVENING
Qty: 90 TABLET | Refills: 1 | Status: SHIPPED | OUTPATIENT
Start: 2021-09-29

## 2021-09-29 NOTE — TELEPHONE ENCOUNTER
Last office visit: (if over 1 year, schedule appointment)  8/9/21    Appointment scheduled with:  Dr. Nancy Lara    Requested medication:   Amlodipine     Pharmacy:    [facility]/PHARMACY [address] [place] [address], [phone]

## 2021-09-29 NOTE — TELEPHONE ENCOUNTER
Per Dr. Carina Doe notes 2/3/21: Essential hypertension with goal blood pressure less than 140/90  -     amLODIPine Besylate 5 MG Oral Tab; Take 1 tablet (5 mg total) by mouth every evening.  -     amLODIPine Besylate 2.5 MG Oral Tab;  Take 1 tablet (2.5 m

## 2021-09-30 ENCOUNTER — TELEPHONE (OUTPATIENT)
Dept: FAMILY MEDICINE CLINIC | Facility: CLINIC | Age: 66
End: 2021-09-30

## 2021-10-01 ENCOUNTER — PATIENT MESSAGE (OUTPATIENT)
Dept: FAMILY MEDICINE CLINIC | Facility: CLINIC | Age: 66
End: 2021-10-01

## 2021-10-01 DIAGNOSIS — E55.9 VITAMIN D DEFICIENCY: Primary | ICD-10-CM

## 2021-10-01 NOTE — TELEPHONE ENCOUNTER
LOV 08-09-21    Patient requesting to check Vitamin D level again. Last lab on 05-06-21.      Pended for your approval.

## 2021-10-01 NOTE — TELEPHONE ENCOUNTER
From: Adina White  To: Marija Muñoz MD  Sent: 10/1/2021 12:23 PM CDT  Subject: Lab Order for Vitamin D level    Hello:   In the past I’ve had a very low Vitamin D level (16) & received the usual high dose treatment followed by 1000 IU daily maintenanc

## 2021-11-10 ENCOUNTER — OFFICE VISIT (OUTPATIENT)
Dept: FAMILY MEDICINE CLINIC | Facility: CLINIC | Age: 66
End: 2021-11-10
Payer: MEDICARE

## 2021-11-10 VITALS
RESPIRATION RATE: 16 BRPM | TEMPERATURE: 97 F | HEIGHT: 63.5 IN | BODY MASS INDEX: 23.8 KG/M2 | WEIGHT: 136 LBS | DIASTOLIC BLOOD PRESSURE: 66 MMHG | OXYGEN SATURATION: 100 % | HEART RATE: 96 BPM | SYSTOLIC BLOOD PRESSURE: 124 MMHG

## 2021-11-10 DIAGNOSIS — Z23 NEED FOR VACCINATION: ICD-10-CM

## 2021-11-10 DIAGNOSIS — Z12.31 VISIT FOR SCREENING MAMMOGRAM: ICD-10-CM

## 2021-11-10 DIAGNOSIS — I10 ESSENTIAL HYPERTENSION WITH GOAL BLOOD PRESSURE LESS THAN 140/90: ICD-10-CM

## 2021-11-10 DIAGNOSIS — M81.0 SENILE OSTEOPOROSIS: ICD-10-CM

## 2021-11-10 DIAGNOSIS — M05.79 RHEUMATOID ARTHRITIS INVOLVING MULTIPLE SITES WITH POSITIVE RHEUMATOID FACTOR (HCC): ICD-10-CM

## 2021-11-10 DIAGNOSIS — Z00.00 ENCOUNTER FOR ANNUAL HEALTH EXAMINATION: Primary | ICD-10-CM

## 2021-11-10 DIAGNOSIS — E78.00 HYPERCHOLESTEREMIA: ICD-10-CM

## 2021-11-10 DIAGNOSIS — F31.9 BIPOLAR DISORDER WITH DEPRESSION (HCC): ICD-10-CM

## 2021-11-10 DIAGNOSIS — E55.9 VITAMIN D DEFICIENCY: ICD-10-CM

## 2021-11-10 PROBLEM — M79.672 FOOT PAIN, LEFT: Status: RESOLVED | Noted: 2018-01-08 | Resolved: 2021-11-10

## 2021-11-10 PROBLEM — R19.6 HALITOSIS: Status: RESOLVED | Noted: 2019-01-21 | Resolved: 2021-11-10

## 2021-11-10 PROBLEM — R73.9 HYPERGLYCEMIA: Status: RESOLVED | Noted: 2017-03-14 | Resolved: 2021-11-10

## 2021-11-10 PROBLEM — E87.6 HYPOKALEMIA: Status: RESOLVED | Noted: 2017-03-14 | Resolved: 2021-11-10

## 2021-11-10 PROCEDURE — G0438 PPPS, INITIAL VISIT: HCPCS | Performed by: FAMILY MEDICINE

## 2021-11-10 PROCEDURE — G0008 ADMIN INFLUENZA VIRUS VAC: HCPCS | Performed by: FAMILY MEDICINE

## 2021-11-10 PROCEDURE — 90662 IIV NO PRSV INCREASED AG IM: CPT | Performed by: FAMILY MEDICINE

## 2021-11-10 NOTE — PATIENT INSTRUCTIONS
John Reinoso's SCREENING SCHEDULE   Tests on this list are recommended by your physician but may not be covered, or covered at this frequency, by your insurer. Please check with your insurance carrier before scheduling to verify coverage.    PREVEN DENSITOMETRY (CPT=77080) 01/13/2020      No recommendations at this time   Pap and Pelvic    Pap   Covered every 2 years for women at normal risk;  Annually if at high risk -  No recommendations at this time    Chlamydia Annually if high risk -  No recommen http://www. idph.state. [place]/public/books/advin.htm  A link to the [employer]. This site has a lot of good information including definitions of the different types of Advance Directives.  It also has the State forms available on it's webs

## 2021-11-10 NOTE — PROGRESS NOTES
HPI:   Rita Dumont is a 77year old female who presents for a Medicare Subsequent Annual Wellness visit (Pt already had Initial Annual Wellness).   Otherwise healthy female with history of hypertension, gastroesophageal reflux, irritable bowel syndr osteoporosis     Rheumatoid arthritis involving multiple sites with positive rheumatoid factor ([facility]     Encounter for long-term (current) use of high-risk medication     Anemia     Major depressive disorder, recurrent episode, severe ([facility]     Bipolar dis by mouth daily. (Patient taking differently: Take 2 mg by mouth daily.)  lansoprazole 30 MG Oral Capsule Delayed Release, Take 1 capsule (30 mg total) by mouth 3 (three) times a week. Coenzyme Q10 (COQ-10 OR), Take 200 mg by mouth 2 (two) times daily.   fo Thyrotoxicosis without mention of goiter or other cause, without mention of thyrotoxic crisis or storm, Urinary retention, Visual impairment, and Vitamin D deficiency.     She  has a past surgical history that includes hysterectomy; eugenia localization wire 1 Able To Tolerate Visual Acuity: Yes      GENERAL: well developed, well nourished, in no apparent distress  SKIN: no rashes, no suspicious lesions  HEENT: atraumatic, normocephalic, ears and throat are clear  EYES: PERRLA, EOMI, conjunctiva are clear  NEC FREE    Vitamin D deficiency  -Continue over-the-counter vitamin D  Check vitamin D levels    VITAMIN D; Future    Essential hypertension with goal blood pressure less than 140/90  Under control  Continue low-salt diet  Exercise  Compliance with medication scheduling to verify coverage.    PREVENTATIVE SERVICES FREQUENCY &  COVERAGE DETAILS LAST COMPLETION DATE   Diabetes Screening    Fasting Blood Sugar /  Glucose    One screening every 12 months if never tested or if previously tested but not diagnosed with Annually if high risk -  No recommendations at this time   Screening Mammogram    Mammogram     Recommend annually for all female patients aged 36 and older    One baseline mammogram covered for patients aged 35-39 10/27/2020    Mammogram due on 10/27/2021

## 2021-11-16 ENCOUNTER — LAB ENCOUNTER (OUTPATIENT)
Dept: LAB | Age: 66
End: 2021-11-16
Attending: FAMILY MEDICINE
Payer: MEDICARE

## 2021-11-16 DIAGNOSIS — Z00.00 ENCOUNTER FOR ANNUAL HEALTH EXAMINATION: ICD-10-CM

## 2021-11-16 DIAGNOSIS — Z79.899 ENCOUNTER FOR LONG-TERM (CURRENT) USE OF HIGH-RISK MEDICATION: ICD-10-CM

## 2021-11-16 DIAGNOSIS — M05.79 RHEUMATOID ARTHRITIS INVOLVING MULTIPLE SITES WITH POSITIVE RHEUMATOID FACTOR (HCC): ICD-10-CM

## 2021-11-16 DIAGNOSIS — E55.9 VITAMIN D DEFICIENCY: ICD-10-CM

## 2021-11-16 DIAGNOSIS — E78.00 HYPERCHOLESTEREMIA: ICD-10-CM

## 2021-11-16 DIAGNOSIS — M81.0 SENILE OSTEOPOROSIS: ICD-10-CM

## 2021-11-16 PROCEDURE — 82306 VITAMIN D 25 HYDROXY: CPT

## 2021-11-16 PROCEDURE — 82248 BILIRUBIN DIRECT: CPT

## 2021-11-16 PROCEDURE — 36415 COLL VENOUS BLD VENIPUNCTURE: CPT

## 2021-11-16 PROCEDURE — 80053 COMPREHEN METABOLIC PANEL: CPT

## 2021-11-16 PROCEDURE — 80061 LIPID PANEL: CPT

## 2021-11-16 PROCEDURE — 86140 C-REACTIVE PROTEIN: CPT

## 2021-11-16 PROCEDURE — 85652 RBC SED RATE AUTOMATED: CPT

## 2021-11-16 PROCEDURE — 85025 COMPLETE CBC W/AUTO DIFF WBC: CPT

## 2021-11-16 PROCEDURE — 84443 ASSAY THYROID STIM HORMONE: CPT

## 2021-11-29 DIAGNOSIS — I10 ESSENTIAL HYPERTENSION WITH GOAL BLOOD PRESSURE LESS THAN 140/90: ICD-10-CM

## 2021-11-29 DIAGNOSIS — E78.00 HYPERCHOLESTEROLEMIA: ICD-10-CM

## 2021-11-29 RX ORDER — ROSUVASTATIN CALCIUM 10 MG/1
TABLET, COATED ORAL
Qty: 90 TABLET | Refills: 1 | OUTPATIENT
Start: 2021-11-29

## 2021-11-29 RX ORDER — AMLODIPINE BESYLATE 2.5 MG/1
2.5 TABLET ORAL DAILY
Qty: 90 TABLET | Refills: 1 | OUTPATIENT
Start: 2021-11-29

## 2021-12-19 DIAGNOSIS — I10 ESSENTIAL HYPERTENSION WITH GOAL BLOOD PRESSURE LESS THAN 140/90: ICD-10-CM

## 2021-12-20 ENCOUNTER — PATIENT MESSAGE (OUTPATIENT)
Dept: FAMILY MEDICINE CLINIC | Facility: CLINIC | Age: 66
End: 2021-12-20

## 2021-12-20 RX ORDER — AMLODIPINE BESYLATE 2.5 MG/1
2.5 TABLET ORAL DAILY
Qty: 90 TABLET | Refills: 1 | OUTPATIENT
Start: 2021-12-20

## 2021-12-20 NOTE — TELEPHONE ENCOUNTER
Spoke to patient. Notified patient to call the pharmacy for refill.      E-Prescribing Status: Receipt confirmed by pharmacy (9/29/2021  4:58 PM CDT)

## 2021-12-20 NOTE — TELEPHONE ENCOUNTER
From: Patricio Urban  To: Radha Gonzalez MD  Sent: 12/20/2021 10:14 AM CST  Subject: Refill of Amlodipine 5mg tab    Hello: There’s some confusion with my prescriptions. I received a message that my refill request for Amlodipine 2.5 mg was denied.  I

## 2021-12-20 NOTE — TELEPHONE ENCOUNTER
amLODIPine 5 MG Oral Tab 90 tablet 1 9/29/2021     Sig - Route:  Take 1 tablet (5 mg total) by mouth every evening. - Oral    Sent to pharmacy as: amLODIPine Besylate 5 MG Oral Tablet (NORVASC)    E-Prescribing Status: Receipt confirmed by pharmacy (9/29/20

## 2021-12-21 ENCOUNTER — LAB ENCOUNTER (OUTPATIENT)
Dept: LAB | Age: 66
End: 2021-12-21
Attending: INTERNAL MEDICINE
Payer: MEDICARE

## 2021-12-21 DIAGNOSIS — R53.83 FATIGUE, UNSPECIFIED TYPE: ICD-10-CM

## 2021-12-21 PROCEDURE — 36415 COLL VENOUS BLD VENIPUNCTURE: CPT

## 2021-12-21 PROCEDURE — 82533 TOTAL CORTISOL: CPT

## 2021-12-22 NOTE — PROGRESS NOTES
Hi Dr. John Chin,     Cortisol level seems to be in the normal range although it was done at 12:45pm instead of the 7-9am or 3-5pm time frames.  Let me know if you wish to repeat the test so you can have blood drawn within those time frames to improve the accur

## 2022-01-04 ENCOUNTER — LAB ENCOUNTER (OUTPATIENT)
Dept: LAB | Facility: HOSPITAL | Age: 67
End: 2022-01-04
Attending: INTERNAL MEDICINE
Payer: MEDICARE

## 2022-01-04 DIAGNOSIS — R53.83 FATIGUE, UNSPECIFIED TYPE: ICD-10-CM

## 2022-01-04 LAB — CORTIS SERPL-MCNC: 5.9 UG/DL

## 2022-01-04 PROCEDURE — 82533 TOTAL CORTISOL: CPT

## 2022-01-04 PROCEDURE — 36415 COLL VENOUS BLD VENIPUNCTURE: CPT

## 2022-01-05 NOTE — PROGRESS NOTES
Hi Dr. Tamara Ferguson,     Cortisol level is in the normal range. Happy New Year!     [facility]

## 2022-01-13 ENCOUNTER — TELEPHONE (OUTPATIENT)
Dept: FAMILY MEDICINE CLINIC | Facility: CLINIC | Age: 67
End: 2022-01-13

## 2022-01-13 ENCOUNTER — OFFICE VISIT (OUTPATIENT)
Dept: FAMILY MEDICINE CLINIC | Facility: CLINIC | Age: 67
End: 2022-01-13
Payer: MEDICARE

## 2022-01-13 VITALS
HEIGHT: 63.5 IN | TEMPERATURE: 97 F | RESPIRATION RATE: 16 BRPM | BODY MASS INDEX: 23.27 KG/M2 | OXYGEN SATURATION: 99 % | HEART RATE: 94 BPM | DIASTOLIC BLOOD PRESSURE: 74 MMHG | WEIGHT: 133 LBS | SYSTOLIC BLOOD PRESSURE: 106 MMHG

## 2022-01-13 DIAGNOSIS — M70.61 TROCHANTERIC BURSITIS OF RIGHT HIP: Primary | ICD-10-CM

## 2022-01-13 PROCEDURE — 99214 OFFICE O/P EST MOD 30 MIN: CPT | Performed by: FAMILY MEDICINE

## 2022-01-13 RX ORDER — PREDNISONE 20 MG/1
20 TABLET ORAL DAILY
Qty: 5 TABLET | Refills: 0 | Status: SHIPPED | OUTPATIENT
Start: 2022-01-13 | End: 2022-01-18

## 2022-01-13 NOTE — PROGRESS NOTES
/74   Pulse 94   Temp 97.2 °F (36.2 °C) (Temporal)   Resp 16   Ht 5' 3.5\" (1.613 m)   Wt 133 lb (60.3 kg)   SpO2 99%   BMI 23.19 kg/m²               Patient presents with:  Low Back Pain: x 2 weeks       HPI;    Gertrudis Carrillo is a 77year old fe times a week. 36 capsule 2   • Coenzyme Q10 (COQ-10 OR) Take 200 mg by mouth 2 (two) times daily. • folic acid 1 MG Oral Tab Take 1 tablet (1 mg total) by mouth daily. 90 tablet 3   • Zinc 50 MG Oral Cap Take 50 mg by mouth 3 (three) times a week. dermatitis, unspecified    • Tachycardia, unspecified    • Thyrotoxicosis without mention of goiter or other cause, without mention of thyrotoxic crisis or storm    • Urinary retention    • Visual impairment     glasses   • Vitamin D deficiency       Socia plan.  Imaging & Consults:  None  Meds & Refills for this Visit:  Requested Prescriptions     Signed Prescriptions Disp Refills   • predniSONE 20 MG Oral Tab 5 tablet 0     Sig: Take 1 tablet (20 mg total) by mouth daily for 5 days.      No orders of the de

## 2022-01-13 NOTE — TELEPHONE ENCOUNTER
Dr. Nargis Putnam,  Please advise    Is the Prednisone 20 mg ordered today in place of OR in addition to the Prednisone 1 mg that the patient takes daily ordered by Dr. Kesha Walls    predniSONE 20 MG Oral Tab 5 tablet 0 1/13/2022 1/18/2022    Sig - Route:  Take 1 tablet (2

## 2022-01-13 NOTE — TELEPHONE ENCOUNTER
pharmacy calling about prednisone script sent in today. currently patient is on 1mg twice daily of prednisone prescribed by another provider.    Pharmacist asking if she has stopped that and will use new script? or is this script to be used with other scr

## 2022-01-14 ENCOUNTER — TELEPHONE (OUTPATIENT)
Dept: ORTHOPEDICS CLINIC | Facility: CLINIC | Age: 67
End: 2022-01-14

## 2022-01-14 DIAGNOSIS — M25.551 RIGHT HIP PAIN: Primary | ICD-10-CM

## 2022-01-14 NOTE — TELEPHONE ENCOUNTER
Called pharmacy and informed the new rx for Prednisone 20 mg is in addition to her usual daily dose.

## 2022-01-14 NOTE — TELEPHONE ENCOUNTER
Patient coming in for right hip pain, no previous images.  Please add    Future Appointments   Date Time Provider Benjamin Rhea   2/9/2022 11:30 AM Farrah Elizabeth MD [facility]   2/16/2022  9:00 AM Anitha La MD [facility]   2/16/

## 2022-01-14 NOTE — TELEPHONE ENCOUNTER
Reviewed patients chart, xray orders are required. Order placed for right hip xrays.  Mychart sent to patient  Future Appointments   Date Time Provider Benjamin Wilkerson   2/9/2022 11:30 AM Addy Roldan MD [facility]   2/16/2022  9:00 AM Candie Harrington

## 2022-01-26 DIAGNOSIS — I10 ESSENTIAL HYPERTENSION WITH GOAL BLOOD PRESSURE LESS THAN 140/90: ICD-10-CM

## 2022-01-26 RX ORDER — POTASSIUM CHLORIDE 750 MG/1
TABLET, EXTENDED RELEASE ORAL
Qty: 270 TABLET | Refills: 1 | Status: SHIPPED | OUTPATIENT
Start: 2022-01-26

## 2022-01-26 NOTE — TELEPHONE ENCOUNTER
Name from pharmacy: [address]          Will file in chart as: KLOR-CON M10 10 MEQ Oral Tab CR    Sig: TAKE 1 TABLET BY MOUTH EVERY MORNING AND 2 TABLETS EVERY EVENING    Disp:  270 tablet    Refills:  1    Start: 1/26/2022    Class: Normal    Non

## 2022-02-18 RX ORDER — TRIAMTERENE AND HYDROCHLOROTHIAZIDE 37.5; 25 MG/1; MG/1
TABLET ORAL
Qty: 90 TABLET | Refills: 1 | Status: SHIPPED | OUTPATIENT
Start: 2022-02-18

## 2022-02-18 NOTE — TELEPHONE ENCOUNTER
LOV 11/10/2021    LAST LAB 11/16/2021    LAST RX   TRIAMTERENE-HCTZ 37.5-25 MG Oral Tab 90 tablet 1 8/27/2021         Next OV   Future Appointments   Date Time Provider Benjamin Rhea   3/11/2022  9:40 AM [facility] RM3 [facility]   5/18/2022  9:40 AM Maria E Cuellar MD [facility]         PROTOCOL passed

## 2022-03-11 ENCOUNTER — HOSPITAL ENCOUNTER (OUTPATIENT)
Dept: MAMMOGRAPHY | Facility: HOSPITAL | Age: 67
Discharge: HOME | End: 2022-03-11
Attending: FAMILY MEDICINE
Payer: MEDICARE

## 2022-03-11 ENCOUNTER — LAB ENCOUNTER (OUTPATIENT)
Dept: LAB | Age: 67
End: 2022-03-11
Attending: FAMILY MEDICINE
Payer: MEDICARE

## 2022-03-11 DIAGNOSIS — D64.9 ANEMIA, UNSPECIFIED TYPE: ICD-10-CM

## 2022-03-11 DIAGNOSIS — Z12.31 VISIT FOR SCREENING MAMMOGRAM: ICD-10-CM

## 2022-03-11 LAB
FOLATE SERPL-MCNC: 46 NG/ML (ref 8.7–?)
HGB RETIC QN AUTO: 27.7 PG (ref 28.2–36.6)
IMM RETICS NFR: 0.14 RATIO (ref 0.1–0.3)
IRON SATN MFR SERPL: 29 %
IRON SERPL-MCNC: 84 UG/DL
RETICS # AUTO: 79.2 X10(3) UL (ref 22.5–147.5)
RETICS/RBC NFR AUTO: 1.6 %
TIBC SERPL-MCNC: 294 UG/DL (ref 240–450)
TRANSFERRIN SERPL-MCNC: 197 MG/DL (ref 200–360)
VIT B12 SERPL-MCNC: 473 PG/ML (ref 193–986)

## 2022-03-11 PROCEDURE — 85045 AUTOMATED RETICULOCYTE COUNT: CPT

## 2022-03-11 PROCEDURE — 36415 COLL VENOUS BLD VENIPUNCTURE: CPT

## 2022-03-11 PROCEDURE — 77063 BREAST TOMOSYNTHESIS BI: CPT | Performed by: FAMILY MEDICINE

## 2022-03-11 PROCEDURE — 77067 SCR MAMMO BI INCL CAD: CPT | Performed by: FAMILY MEDICINE

## 2022-03-11 PROCEDURE — 83550 IRON BINDING TEST: CPT

## 2022-03-11 PROCEDURE — 82607 VITAMIN B-12: CPT

## 2022-03-11 PROCEDURE — 82746 ASSAY OF FOLIC ACID SERUM: CPT

## 2022-03-11 PROCEDURE — 83540 ASSAY OF IRON: CPT

## 2022-03-23 RX ORDER — AMLODIPINE BESYLATE 5 MG/1
5 TABLET ORAL EVERY EVENING
Qty: 90 TABLET | Refills: 1 | Status: CANCELLED | OUTPATIENT
Start: 2022-03-23

## 2022-03-23 RX ORDER — ROSUVASTATIN CALCIUM 10 MG/1
10 TABLET, COATED ORAL NIGHTLY
Qty: 90 TABLET | Refills: 1 | Status: SHIPPED | OUTPATIENT
Start: 2022-03-23

## 2022-03-23 RX ORDER — AMLODIPINE BESYLATE 5 MG/1
TABLET ORAL
Qty: 90 TABLET | Refills: 1 | OUTPATIENT
Start: 2022-03-23

## 2022-03-23 RX ORDER — TRIAMTERENE AND HYDROCHLOROTHIAZIDE 37.5; 25 MG/1; MG/1
1 TABLET ORAL DAILY
Qty: 90 TABLET | Refills: 1 | Status: CANCELLED | OUTPATIENT
Start: 2022-03-23

## 2022-03-23 RX ORDER — AMLODIPINE BESYLATE 2.5 MG/1
2.5 TABLET ORAL DAILY
Qty: 90 TABLET | Refills: 1 | Status: SHIPPED | OUTPATIENT
Start: 2022-03-23

## 2022-03-23 NOTE — TELEPHONE ENCOUNTER
Hypertension Medications Protocol Passed 03/23/2022 09:51 AM   Protocol Details  CMP or BMP in past 12 months    Last serum creatinine< 2.0    Appointment in past 6 or next 3 months     LOV 11/10/21 k.k    LAST LAB 11/16/21     LAST RX  9/29/21 90 with 1      Next OV   Future Appointments   Date Time Provider Benjamin Wilkerson   5/18/2022  9:40 AM Tanvi Reyna MD [facility] SPECIALTY [facility]         PROTOCOL pass

## 2022-03-23 NOTE — TELEPHONE ENCOUNTER
LOV 11/10/2021    LAST LAB 11/16/21    LAST RX   ROSUVASTATIN 10 MG Oral Tab 90 tablet 1 8/30/2021         Next OV   Future Appointments   Date Time Provider Benjamin Wilkerson   5/18/2022  9:40 AM Jose Alberto López MD [facility]         PROTOCOL PASS

## 2022-03-28 ENCOUNTER — TELEPHONE (OUTPATIENT)
Dept: FAMILY MEDICINE CLINIC | Facility: CLINIC | Age: 67
End: 2022-03-28

## 2022-03-28 RX ORDER — AMLODIPINE BESYLATE 5 MG/1
5 TABLET ORAL EVERY EVENING
Qty: 90 TABLET | Refills: 1 | Status: SHIPPED | OUTPATIENT
Start: 2022-03-28

## 2022-03-28 NOTE — TELEPHONE ENCOUNTER
Patient takes Amlodipine 7.5 mg daily (one 5 mg tablet added to one 2.5 mg tablet). 2.5 mg filled but 5 mg tablet accidentally denied for \"duplicate\". Sent 5 mg tablets to [facility]. When she is due for a refill, patient will ask to change rx to take three 2.5 mg tablets instead to avoid misunderstandings in the future.

## 2022-03-28 NOTE — TELEPHONE ENCOUNTER
Patient is asking why amlodipine 5 mg is being denied. She is out of medication. Please advise patient.

## 2022-04-18 ENCOUNTER — HOSPITAL ENCOUNTER (OUTPATIENT)
Dept: MAMMOGRAPHY | Facility: HOSPITAL | Age: 67
Discharge: HOME | End: 2022-04-18
Attending: FAMILY MEDICINE
Payer: MEDICARE

## 2022-04-18 DIAGNOSIS — R92.8 ABNORMAL MAMMOGRAM: ICD-10-CM

## 2022-04-18 PROCEDURE — 77061 BREAST TOMOSYNTHESIS UNI: CPT | Performed by: FAMILY MEDICINE

## 2022-04-18 PROCEDURE — 77065 DX MAMMO INCL CAD UNI: CPT | Performed by: FAMILY MEDICINE

## 2022-07-05 ENCOUNTER — LAB ENCOUNTER (OUTPATIENT)
Dept: LAB | Age: 67
End: 2022-07-05
Attending: INTERNAL MEDICINE
Payer: MEDICARE

## 2022-07-05 DIAGNOSIS — M05.79 RHEUMATOID ARTHRITIS INVOLVING MULTIPLE SITES WITH POSITIVE RHEUMATOID FACTOR (HCC): ICD-10-CM

## 2022-07-05 DIAGNOSIS — Z79.899 ENCOUNTER FOR LONG-TERM (CURRENT) USE OF HIGH-RISK MEDICATION: ICD-10-CM

## 2022-07-05 LAB
ALBUMIN SERPL-MCNC: 3.8 G/DL (ref 3.4–5)
ALP LIVER SERPL-CCNC: 73 U/L
ALT SERPL-CCNC: 19 U/L
AST SERPL-CCNC: 11 U/L (ref 15–37)
BASOPHILS # BLD AUTO: 0.05 X10(3) UL (ref 0–0.2)
BASOPHILS NFR BLD AUTO: 1 %
BILIRUB DIRECT SERPL-MCNC: <0.1 MG/DL (ref 0–0.2)
BILIRUB SERPL-MCNC: 0.2 MG/DL (ref 0.1–2)
BUN BLD-MCNC: 17 MG/DL (ref 7–18)
CREAT BLD-MCNC: 0.76 MG/DL
CRP SERPL-MCNC: <0.29 MG/DL (ref ?–0.3)
EOSINOPHIL # BLD AUTO: 0.2 X10(3) UL (ref 0–0.7)
EOSINOPHIL NFR BLD AUTO: 4 %
ERYTHROCYTE [DISTWIDTH] IN BLOOD BY AUTOMATED COUNT: 13.3 %
ERYTHROCYTE [SEDIMENTATION RATE] IN BLOOD: 9 MM/HR
HCT VFR BLD AUTO: 36.7 %
HGB BLD-MCNC: 11.1 G/DL
IMM GRANULOCYTES # BLD AUTO: 0.01 X10(3) UL (ref 0–1)
IMM GRANULOCYTES NFR BLD: 0.2 %
LYMPHOCYTES # BLD AUTO: 2.53 X10(3) UL (ref 1–4)
LYMPHOCYTES NFR BLD AUTO: 50.1 %
MCH RBC QN AUTO: 23.6 PG (ref 26–34)
MCHC RBC AUTO-ENTMCNC: 30.2 G/DL (ref 31–37)
MCV RBC AUTO: 78.1 FL
MONOCYTES # BLD AUTO: 0.79 X10(3) UL (ref 0.1–1)
MONOCYTES NFR BLD AUTO: 15.6 %
NEUTROPHILS # BLD AUTO: 1.47 X10 (3) UL (ref 1.5–7.7)
NEUTROPHILS # BLD AUTO: 1.47 X10(3) UL (ref 1.5–7.7)
NEUTROPHILS NFR BLD AUTO: 29.1 %
PLATELET # BLD AUTO: 254 10(3)UL (ref 150–450)
PROT SERPL-MCNC: 6.9 G/DL (ref 6.4–8.2)
RBC # BLD AUTO: 4.7 X10(6)UL
WBC # BLD AUTO: 5.1 X10(3) UL (ref 4–11)

## 2022-07-05 PROCEDURE — 36415 COLL VENOUS BLD VENIPUNCTURE: CPT

## 2022-07-05 PROCEDURE — 84520 ASSAY OF UREA NITROGEN: CPT

## 2022-07-05 PROCEDURE — 85025 COMPLETE CBC W/AUTO DIFF WBC: CPT

## 2022-07-05 PROCEDURE — 80076 HEPATIC FUNCTION PANEL: CPT

## 2022-07-05 PROCEDURE — 86140 C-REACTIVE PROTEIN: CPT

## 2022-07-05 PROCEDURE — 82565 ASSAY OF CREATININE: CPT

## 2022-07-05 PROCEDURE — 85652 RBC SED RATE AUTOMATED: CPT

## 2022-07-27 ENCOUNTER — OFFICE VISIT (OUTPATIENT)
Dept: ORTHOPEDICS CLINIC | Facility: CLINIC | Age: 67
End: 2022-07-27
Payer: MEDICARE

## 2022-07-27 VITALS — WEIGHT: 131 LBS | HEIGHT: 64.5 IN | OXYGEN SATURATION: 99 % | HEART RATE: 83 BPM | BODY MASS INDEX: 22.09 KG/M2

## 2022-07-27 DIAGNOSIS — S83.242D TEAR OF MEDIAL MENISCUS OF LEFT KNEE, CURRENT, UNSPECIFIED TEAR TYPE, SUBSEQUENT ENCOUNTER: ICD-10-CM

## 2022-07-27 DIAGNOSIS — G89.29 CHRONIC PAIN OF LEFT KNEE: Primary | ICD-10-CM

## 2022-07-27 DIAGNOSIS — M25.562 CHRONIC PAIN OF LEFT KNEE: Primary | ICD-10-CM

## 2022-07-27 PROCEDURE — 99213 OFFICE O/P EST LOW 20 MIN: CPT | Performed by: ORTHOPAEDIC SURGERY

## 2022-07-27 PROCEDURE — 20610 DRAIN/INJ JOINT/BURSA W/O US: CPT | Performed by: ORTHOPAEDIC SURGERY

## 2022-07-27 PROCEDURE — 1126F AMNT PAIN NOTED NONE PRSNT: CPT | Performed by: ORTHOPAEDIC SURGERY

## 2022-07-27 RX ORDER — TRIAMCINOLONE ACETONIDE 40 MG/ML
40 INJECTION, SUSPENSION INTRA-ARTICULAR; INTRAMUSCULAR ONCE
Status: COMPLETED | OUTPATIENT
Start: 2022-07-27 | End: 2022-07-27

## 2022-07-27 RX ADMIN — TRIAMCINOLONE ACETONIDE 40 MG: 40 INJECTION, SUSPENSION INTRA-ARTICULAR; INTRAMUSCULAR at 11:44:00

## 2022-07-27 NOTE — PROCEDURES
After informed consent, the patient's left knee was marked, locally anesthetized with skin refrigerant, prepped with topical antiseptic, and injected with a mixture of 1mL 40mg/mL Kenalog, 2mL 1% lidocaine and 2mL 0.5% marcaine through the inferolateral portal.  A band-aid was applied. The patient tolerated the procedure well.     Adrienne Campos MD, [address] [place]  Phone [phone]  Fax [phone]

## 2022-07-27 NOTE — PROGRESS NOTES
[facility] Progress Note    Subjective: Patient returns today for her left knee. She states that she had about 2 years of relief from the last knee injection. Symptoms of started coming back about 2 to 3 months ago. Symptoms are present on the medial aspect of the knee joint. Denies mechanical symptoms. Objective: Patient is awake alert no distress. No effusion to the left knee. She does have some tenderness about the medial joint line. She has full extension of the knee. Assessment/Plan: 61-year-old female with recurrence of subacute/chronic left knee pain and swelling. 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 Patient had excellent relief for 2 years from previous steroid injection, we did discuss potentially repeating this today. If she continues to have excellent relief for a long period of time then this treatment can be continued. If she does not obtain prolonged pain relief, neck step may be to repeat weightbearing x-rays to evaluate for arthritis, and subsequently guide treatment options which may include evaluation by sports Ortho.     Raul Roberts MD, [address] [place] [address] Orthopedic Surgery  Phone [phone]  Fax [phone]

## 2022-07-28 ENCOUNTER — HOSPITAL ENCOUNTER (OUTPATIENT)
Dept: GENERAL RADIOLOGY | Facility: HOSPITAL | Age: 67
Discharge: HOME | End: 2022-07-28
Attending: INTERNAL MEDICINE
Payer: MEDICARE

## 2022-07-28 ENCOUNTER — LAB ENCOUNTER (OUTPATIENT)
Dept: LAB | Facility: HOSPITAL | Age: 67
End: 2022-07-28
Attending: INTERNAL MEDICINE
Payer: MEDICARE

## 2022-07-28 DIAGNOSIS — M54.50 LOW BACK PAIN: ICD-10-CM

## 2022-07-28 DIAGNOSIS — M79.18 PIRIFORMIS MUSCLE PAIN: ICD-10-CM

## 2022-08-02 DIAGNOSIS — I10 ESSENTIAL HYPERTENSION WITH GOAL BLOOD PRESSURE LESS THAN 140/90: ICD-10-CM

## 2022-08-02 RX ORDER — POTASSIUM CHLORIDE 750 MG/1
TABLET, EXTENDED RELEASE ORAL
Qty: 270 TABLET | Refills: 1 | Status: SHIPPED | OUTPATIENT
Start: 2022-08-02

## 2022-08-30 DIAGNOSIS — I10 ESSENTIAL HYPERTENSION WITH GOAL BLOOD PRESSURE LESS THAN 140/90: ICD-10-CM

## 2022-08-30 RX ORDER — TRIAMTERENE AND HYDROCHLOROTHIAZIDE 37.5; 25 MG/1; MG/1
TABLET ORAL
Qty: 90 TABLET | Refills: 0 | Status: SHIPPED | OUTPATIENT
Start: 2022-08-30

## 2022-08-30 NOTE — TELEPHONE ENCOUNTER
Hypertension Medications Protocol Failed 08/30/2022 12:09 AM   Protocol Details  Appointment in past 6 or next 3 months    CMP or BMP in past 12 months    Last serum creatinine< 2.0     LOV 1/12/22     LAST LAB  11/16/21    LAST RX 2/18/22 90 with 1     Next OV No future appointments. PROTOCOL failed     Please schedule med check .

## 2022-09-20 DIAGNOSIS — E78.00 HYPERCHOLESTEROLEMIA: ICD-10-CM

## 2022-09-20 DIAGNOSIS — I10 ESSENTIAL HYPERTENSION WITH GOAL BLOOD PRESSURE LESS THAN 140/90: ICD-10-CM

## 2022-09-20 RX ORDER — ROSUVASTATIN CALCIUM 10 MG/1
TABLET, COATED ORAL
Qty: 90 TABLET | Refills: 0 | Status: SHIPPED | OUTPATIENT
Start: 2022-09-20

## 2022-09-20 RX ORDER — AMLODIPINE BESYLATE 2.5 MG/1
2.5 TABLET ORAL DAILY
Qty: 90 TABLET | Refills: 0 | Status: SHIPPED | OUTPATIENT
Start: 2022-09-20

## 2022-09-20 RX ORDER — AMLODIPINE BESYLATE 5 MG/1
TABLET ORAL
Qty: 90 TABLET | Refills: 0 | Status: SHIPPED | OUTPATIENT
Start: 2022-09-20

## 2022-10-28 DIAGNOSIS — I10 ESSENTIAL HYPERTENSION WITH GOAL BLOOD PRESSURE LESS THAN 140/90: ICD-10-CM

## 2022-10-31 DIAGNOSIS — K21.9 GASTROESOPHAGEAL REFLUX DISEASE WITHOUT ESOPHAGITIS: ICD-10-CM

## 2022-10-31 RX ORDER — AMLODIPINE BESYLATE 2.5 MG/1
2.5 TABLET ORAL DAILY
Qty: 90 TABLET | Refills: 0 | OUTPATIENT
Start: 2022-10-31

## 2022-10-31 RX ORDER — LANSOPRAZOLE 30 MG/1
30 CAPSULE, DELAYED RELEASE ORAL
Qty: 90 CAPSULE | Refills: 0 | Status: SHIPPED | OUTPATIENT
Start: 2022-10-31

## 2022-10-31 RX ORDER — TRIAMTERENE AND HYDROCHLOROTHIAZIDE 37.5; 25 MG/1; MG/1
TABLET ORAL
Qty: 90 TABLET | Refills: 0 | OUTPATIENT
Start: 2022-10-31

## 2022-10-31 RX ORDER — AMLODIPINE BESYLATE 5 MG/1
TABLET ORAL
Qty: 90 TABLET | Refills: 0 | OUTPATIENT
Start: 2022-10-31

## 2022-12-04 DIAGNOSIS — E78.00 HYPERCHOLESTEROLEMIA: ICD-10-CM

## 2022-12-04 DIAGNOSIS — I10 ESSENTIAL HYPERTENSION WITH GOAL BLOOD PRESSURE LESS THAN 140/90: ICD-10-CM

## 2022-12-05 RX ORDER — AMLODIPINE BESYLATE 5 MG/1
TABLET ORAL
Qty: 90 TABLET | Refills: 0 | OUTPATIENT
Start: 2022-12-05

## 2022-12-05 RX ORDER — AMLODIPINE BESYLATE 2.5 MG/1
2.5 TABLET ORAL DAILY
Qty: 90 TABLET | Refills: 0 | OUTPATIENT
Start: 2022-12-05

## 2022-12-05 RX ORDER — ROSUVASTATIN CALCIUM 10 MG/1
TABLET, COATED ORAL
Qty: 90 TABLET | Refills: 0 | OUTPATIENT
Start: 2022-12-05

## 2022-12-06 ENCOUNTER — PATIENT OUTREACH (OUTPATIENT)
Dept: CASE MANAGEMENT | Age: 67
End: 2022-12-06

## 2022-12-06 NOTE — PROCEDURES
The office order for PCP request is Approved and completed on December 6, 2022.     Thanks,  [facility] [employer]

## 2023-01-20 DIAGNOSIS — E78.00 HYPERCHOLESTEROLEMIA: ICD-10-CM

## 2023-01-20 RX ORDER — ROSUVASTATIN CALCIUM 10 MG/1
TABLET, COATED ORAL
Qty: 90 TABLET | Refills: 0 | OUTPATIENT
Start: 2023-01-20

## 2023-01-20 NOTE — TELEPHONE ENCOUNTER
LOV    LAST LAB    LAST RX     Next OV No future appointments.     PROTOCOL    Cholesterol Medication Protocol Failed 01/20/2023 12:08 AM   Protocol Details  Lipid panel within past 12 months    Appointment within past 12 or next 3 months    ALT < 80    ALT resulted within past year

## 2023-05-09 DIAGNOSIS — K21.9 GASTROESOPHAGEAL REFLUX DISEASE WITHOUT ESOPHAGITIS: ICD-10-CM

## 2023-05-09 RX ORDER — LANSOPRAZOLE 30 MG/1
30 CAPSULE, DELAYED RELEASE ORAL
Qty: 36 CAPSULE | Refills: 2 | OUTPATIENT
Start: 2023-05-10
